# Patient Record
Sex: FEMALE | Race: WHITE | Employment: OTHER | URBAN - METROPOLITAN AREA
[De-identification: names, ages, dates, MRNs, and addresses within clinical notes are randomized per-mention and may not be internally consistent; named-entity substitution may affect disease eponyms.]

---

## 2024-01-01 ENCOUNTER — APPOINTMENT (OUTPATIENT)
Facility: HOSPITAL | Age: 74
End: 2024-01-01
Attending: INTERNAL MEDICINE
Payer: MEDICARE

## 2024-01-01 ENCOUNTER — HOSPITAL ENCOUNTER (OUTPATIENT)
Facility: HOSPITAL | Age: 74
Discharge: HOME | End: 2024-01-14
Attending: THORACIC SURGERY (CARDIOTHORACIC VASCULAR SURGERY)

## 2024-01-01 ENCOUNTER — APPOINTMENT (OUTPATIENT)
Facility: HOSPITAL | Age: 74
DRG: 235 | End: 2024-01-01
Attending: STUDENT IN AN ORGANIZED HEALTH CARE EDUCATION/TRAINING PROGRAM
Payer: MEDICARE

## 2024-01-01 ENCOUNTER — APPOINTMENT (OUTPATIENT)
Facility: HOSPITAL | Age: 74
End: 2024-01-01
Payer: MEDICARE

## 2024-01-01 ENCOUNTER — PREP FOR PROCEDURE (OUTPATIENT)
Age: 74
End: 2024-01-01

## 2024-01-01 ENCOUNTER — ANESTHESIA (OUTPATIENT)
Facility: HOSPITAL | Age: 74
End: 2024-01-01
Payer: MEDICARE

## 2024-01-01 ENCOUNTER — ANESTHESIA EVENT (OUTPATIENT)
Facility: HOSPITAL | Age: 74
End: 2024-01-01
Payer: MEDICARE

## 2024-01-01 ENCOUNTER — HOSPITAL ENCOUNTER (INPATIENT)
Facility: HOSPITAL | Age: 74
LOS: 3 days | DRG: 235 | End: 2024-01-14
Attending: STUDENT IN AN ORGANIZED HEALTH CARE EDUCATION/TRAINING PROGRAM | Admitting: STUDENT IN AN ORGANIZED HEALTH CARE EDUCATION/TRAINING PROGRAM
Payer: MEDICARE

## 2024-01-01 ENCOUNTER — HOSPITAL ENCOUNTER (INPATIENT)
Facility: HOSPITAL | Age: 74
LOS: 1 days | Discharge: ANOTHER HOSPITAL | End: 2024-01-11
Attending: EMERGENCY MEDICINE | Admitting: HOSPITALIST
Payer: MEDICARE

## 2024-01-01 VITALS
TEMPERATURE: 97.9 F | RESPIRATION RATE: 20 BRPM | WEIGHT: 250 LBS | DIASTOLIC BLOOD PRESSURE: 72 MMHG | HEIGHT: 67 IN | BODY MASS INDEX: 39.24 KG/M2 | SYSTOLIC BLOOD PRESSURE: 155 MMHG | HEART RATE: 55 BPM | OXYGEN SATURATION: 97 %

## 2024-01-01 VITALS
TEMPERATURE: 96.8 F | OXYGEN SATURATION: 100 % | DIASTOLIC BLOOD PRESSURE: 51 MMHG | WEIGHT: 293 LBS | RESPIRATION RATE: 23 BRPM | SYSTOLIC BLOOD PRESSURE: 83 MMHG | HEIGHT: 67 IN | BODY MASS INDEX: 45.99 KG/M2

## 2024-01-01 DIAGNOSIS — I20.0 UNSTABLE ANGINA (HCC): Primary | ICD-10-CM

## 2024-01-01 DIAGNOSIS — I25.10 DISEASE OF CARDIOVASCULAR SYSTEM: ICD-10-CM

## 2024-01-01 DIAGNOSIS — I25.118 CORONARY ARTERY DISEASE OF NATIVE ARTERY OF NATIVE HEART WITH STABLE ANGINA PECTORIS (HCC): ICD-10-CM

## 2024-01-01 DIAGNOSIS — I25.9 CHEST PAIN DUE TO MYOCARDIAL ISCHEMIA, UNSPECIFIED ISCHEMIC CHEST PAIN TYPE: ICD-10-CM

## 2024-01-01 DIAGNOSIS — Z95.1 S/P CABG X 3: ICD-10-CM

## 2024-01-01 DIAGNOSIS — I25.10 DISEASE OF CARDIOVASCULAR SYSTEM: Primary | ICD-10-CM

## 2024-01-01 DIAGNOSIS — I21.4 NON-STEMI (NON-ST ELEVATED MYOCARDIAL INFARCTION) (HCC): ICD-10-CM

## 2024-01-01 DIAGNOSIS — I48.0 PAF (PAROXYSMAL ATRIAL FIBRILLATION) (HCC): ICD-10-CM

## 2024-01-01 DIAGNOSIS — I21.4 NSTEMI (NON-ST ELEVATED MYOCARDIAL INFARCTION) (HCC): ICD-10-CM

## 2024-01-01 DIAGNOSIS — Z95.1 POSTSURGICAL AORTOCORONARY BYPASS STATUS: Primary | ICD-10-CM

## 2024-01-01 LAB
ABO + RH BLD: NORMAL
ACUTE KIDNEY INJURY RISK NEPHROCHECK: 1.39 (ref 0–0.3)
ACUTE KIDNEY INJURY RISK NEPHROCHECK: 2.77 (ref 0–0.3)
ALBUMIN SERPL-MCNC: 2.6 G/DL (ref 3.5–5)
ALBUMIN SERPL-MCNC: 2.7 G/DL (ref 3.5–5)
ALBUMIN SERPL-MCNC: 2.8 G/DL (ref 3.5–5)
ALBUMIN SERPL-MCNC: 3 G/DL (ref 3.5–5)
ALBUMIN SERPL-MCNC: 3 G/DL (ref 3.5–5)
ALBUMIN SERPL-MCNC: 3.1 G/DL (ref 3.5–5)
ALBUMIN SERPL-MCNC: 3.4 G/DL (ref 3.4–5)
ALBUMIN SERPL-MCNC: 3.4 G/DL (ref 3.5–5)
ALBUMIN/GLOB SERPL: 0.8 (ref 0.8–1.7)
ALBUMIN/GLOB SERPL: 0.8 (ref 1.1–2.2)
ALBUMIN/GLOB SERPL: 1.3 (ref 1.1–2.2)
ALBUMIN/GLOB SERPL: 1.6 (ref 1.1–2.2)
ALBUMIN/GLOB SERPL: 1.8 (ref 1.1–2.2)
ALBUMIN/GLOB SERPL: 2 (ref 1.1–2.2)
ALP SERPL-CCNC: 27 U/L (ref 45–117)
ALP SERPL-CCNC: 28 U/L (ref 45–117)
ALP SERPL-CCNC: 31 U/L (ref 45–117)
ALP SERPL-CCNC: 37 U/L (ref 45–117)
ALP SERPL-CCNC: 38 U/L (ref 45–117)
ALP SERPL-CCNC: 38 U/L (ref 45–117)
ALP SERPL-CCNC: 57 U/L (ref 45–117)
ALP SERPL-CCNC: 59 U/L (ref 45–117)
ALT SERPL-CCNC: 1271 U/L (ref 12–78)
ALT SERPL-CCNC: 21 U/L (ref 12–78)
ALT SERPL-CCNC: 2381 U/L (ref 12–78)
ALT SERPL-CCNC: 26 U/L (ref 12–78)
ALT SERPL-CCNC: 28 U/L (ref 12–78)
ALT SERPL-CCNC: 32 U/L (ref 13–56)
ALT SERPL-CCNC: 34 U/L (ref 12–78)
ALT SERPL-CCNC: 565 U/L (ref 12–78)
ANION GAP BLD CALC-SCNC: 21 (ref 10–20)
ANION GAP BLD CALC-SCNC: 5 (ref 10–20)
ANION GAP BLD CALC-SCNC: ABNORMAL (ref 10–20)
ANION GAP SERPL CALC-SCNC: 10 MMOL/L (ref 5–15)
ANION GAP SERPL CALC-SCNC: 13 MMOL/L (ref 5–15)
ANION GAP SERPL CALC-SCNC: 13 MMOL/L (ref 5–15)
ANION GAP SERPL CALC-SCNC: 18 MMOL/L (ref 5–15)
ANION GAP SERPL CALC-SCNC: 21 MMOL/L (ref 5–15)
ANION GAP SERPL CALC-SCNC: 27 MMOL/L (ref 5–15)
ANION GAP SERPL CALC-SCNC: 8 MMOL/L (ref 5–15)
ANION GAP SERPL CALC-SCNC: 9 MMOL/L (ref 3–18)
ANION GAP SERPL CALC-SCNC: 9 MMOL/L (ref 3–18)
ANION GAP SERPL CALC-SCNC: 9 MMOL/L (ref 5–15)
APPEARANCE UR: CLEAR
APTT PPP: 114.8 SEC (ref 23–36.4)
APTT PPP: 23.1 SEC (ref 22.1–31)
APTT PPP: 28.6 SEC (ref 22.1–31)
APTT PPP: 28.9 SEC (ref 22.1–31)
APTT PPP: 30.6 SEC (ref 23–36.4)
APTT PPP: 39.1 SEC (ref 22.1–31)
APTT PPP: 40.6 SEC (ref 23–36.4)
APTT PPP: 42.5 SEC (ref 23–36.4)
APTT PPP: 53.4 SEC (ref 22.1–31)
AST SERPL-CCNC: 1809 U/L (ref 15–37)
AST SERPL-CCNC: 27 U/L (ref 15–37)
AST SERPL-CCNC: 29 U/L (ref 10–38)
AST SERPL-CCNC: 29 U/L (ref 15–37)
AST SERPL-CCNC: 35 U/L (ref 15–37)
AST SERPL-CCNC: 36 U/L (ref 15–37)
AST SERPL-CCNC: 699 U/L (ref 15–37)
AST SERPL-CCNC: >2000 U/L (ref 15–37)
BACTERIA URNS QL MICRO: NEGATIVE /HPF
BASE DEFICIT BLD-SCNC: 13.5 MMOL/L
BASE DEFICIT BLD-SCNC: 13.6 MMOL/L
BASE DEFICIT BLD-SCNC: 15.7 MMOL/L
BASE DEFICIT BLD-SCNC: 17.7 MMOL/L
BASE DEFICIT BLD-SCNC: 17.8 MMOL/L
BASE DEFICIT BLD-SCNC: 19 MMOL/L
BASE DEFICIT BLD-SCNC: 2.6 MMOL/L
BASE DEFICIT BLD-SCNC: 20.2 MMOL/L
BASE DEFICIT BLD-SCNC: 20.4 MMOL/L
BASE DEFICIT BLD-SCNC: 22 MMOL/L
BASE DEFICIT BLD-SCNC: 22.1 MMOL/L
BASE DEFICIT BLD-SCNC: 22.4 MMOL/L
BASE DEFICIT BLD-SCNC: 23.3 MMOL/L
BASE DEFICIT BLD-SCNC: 24.4 MMOL/L
BASE DEFICIT BLD-SCNC: 3.4 MMOL/L
BASE DEFICIT BLD-SCNC: 4.9 MMOL/L
BASE DEFICIT BLD-SCNC: 5.4 MMOL/L
BASE DEFICIT BLD-SCNC: 5.7 MMOL/L
BASE DEFICIT BLD-SCNC: 6.3 MMOL/L
BASE DEFICIT BLD-SCNC: 7.5 MMOL/L
BASE DEFICIT BLD-SCNC: 8.9 MMOL/L
BASOPHILS # BLD: 0 K/UL (ref 0–0.1)
BASOPHILS # BLD: 0.1 K/UL (ref 0–0.1)
BASOPHILS NFR BLD: 0 % (ref 0–1)
BASOPHILS NFR BLD: 1 % (ref 0–1)
BASOPHILS NFR BLD: 1 % (ref 0–2)
BASOPHILS NFR BLD: 1 % (ref 0–2)
BDY SITE: ABNORMAL
BILIRUB SERPL-MCNC: 0.5 MG/DL (ref 0.2–1)
BILIRUB SERPL-MCNC: 0.6 MG/DL (ref 0.2–1)
BILIRUB SERPL-MCNC: 0.6 MG/DL (ref 0.2–1)
BILIRUB SERPL-MCNC: 0.7 MG/DL (ref 0.2–1)
BILIRUB SERPL-MCNC: 0.9 MG/DL (ref 0.2–1)
BILIRUB UR QL: NEGATIVE
BLD PROD TYP BPU: NORMAL
BLOOD BANK DISPENSE STATUS: NORMAL
BLOOD GROUP ANTIBODIES SERPL: NORMAL
BODY TEMPERATURE: 97
BPU ID: NORMAL
BUN SERPL-MCNC: 11 MG/DL (ref 6–20)
BUN SERPL-MCNC: 12 MG/DL (ref 6–20)
BUN SERPL-MCNC: 12 MG/DL (ref 7–18)
BUN SERPL-MCNC: 13 MG/DL (ref 7–18)
BUN SERPL-MCNC: 15 MG/DL (ref 6–20)
BUN SERPL-MCNC: 16 MG/DL (ref 6–20)
BUN SERPL-MCNC: 17 MG/DL (ref 6–20)
BUN SERPL-MCNC: 18 MG/DL (ref 6–20)
BUN SERPL-MCNC: 23 MG/DL (ref 6–20)
BUN SERPL-MCNC: 27 MG/DL (ref 6–20)
BUN/CREAT SERPL: 11 (ref 12–20)
BUN/CREAT SERPL: 13 (ref 12–20)
BUN/CREAT SERPL: 13 (ref 12–20)
BUN/CREAT SERPL: 14 (ref 12–20)
BUN/CREAT SERPL: 19 (ref 12–20)
BUN/CREAT SERPL: 19 (ref 12–20)
BUN/CREAT SERPL: 8 (ref 12–20)
BUN/CREAT SERPL: 8 (ref 12–20)
BUN/CREAT SERPL: 9 (ref 12–20)
BUN/CREAT SERPL: 9 (ref 12–20)
CA-I BLD-MCNC: 1.06 MMOL/L (ref 1.12–1.32)
CA-I BLD-MCNC: 1.21 MMOL/L (ref 1.12–1.32)
CA-I BLD-MCNC: 1.35 MMOL/L (ref 1.12–1.32)
CA-I BLD-SCNC: 1.01 MMOL/L (ref 1.12–1.32)
CA-I BLD-SCNC: 1.02 MMOL/L (ref 1.12–1.32)
CA-I BLD-SCNC: 1.04 MMOL/L (ref 1.12–1.32)
CA-I BLD-SCNC: 1.05 MMOL/L (ref 1.12–1.32)
CA-I BLD-SCNC: 1.06 MMOL/L (ref 1.12–1.32)
CA-I BLD-SCNC: 1.07 MMOL/L (ref 1.12–1.32)
CA-I BLD-SCNC: 1.08 MMOL/L (ref 1.12–1.32)
CA-I BLD-SCNC: 1.08 MMOL/L (ref 1.12–1.32)
CA-I BLD-SCNC: 1.1 MMOL/L (ref 1.12–1.32)
CA-I BLD-SCNC: 1.11 MMOL/L (ref 1.12–1.32)
CA-I BLD-SCNC: 1.12 MMOL/L (ref 1.12–1.32)
CA-I BLD-SCNC: 1.16 MMOL/L (ref 1.12–1.32)
CA-I BLD-SCNC: 1.2 MMOL/L (ref 1.12–1.32)
CA-I BLD-SCNC: 1.21 MMOL/L (ref 1.12–1.32)
CA-I BLD-SCNC: 1.21 MMOL/L (ref 1.12–1.32)
CA-I BLD-SCNC: 1.22 MMOL/L (ref 1.12–1.32)
CA-I BLD-SCNC: 1.25 MMOL/L (ref 1.12–1.32)
CALCIUM SERPL-MCNC: 7.5 MG/DL (ref 8.5–10.1)
CALCIUM SERPL-MCNC: 7.6 MG/DL (ref 8.5–10.1)
CALCIUM SERPL-MCNC: 7.7 MG/DL (ref 8.5–10.1)
CALCIUM SERPL-MCNC: 7.7 MG/DL (ref 8.5–10.1)
CALCIUM SERPL-MCNC: 7.8 MG/DL (ref 8.5–10.1)
CALCIUM SERPL-MCNC: 8.1 MG/DL (ref 8.5–10.1)
CALCIUM SERPL-MCNC: 8.2 MG/DL (ref 8.5–10.1)
CALCIUM SERPL-MCNC: 9 MG/DL (ref 8.5–10.1)
CALCIUM SERPL-MCNC: 9.3 MG/DL (ref 8.5–10.1)
CALCIUM SERPL-MCNC: 9.9 MG/DL (ref 8.5–10.1)
CHLORIDE BLD-SCNC: 112 MMOL/L (ref 100–108)
CHLORIDE BLD-SCNC: 123 MMOL/L (ref 100–108)
CHLORIDE SERPL-SCNC: 110 MMOL/L (ref 100–111)
CHLORIDE SERPL-SCNC: 112 MMOL/L (ref 100–111)
CHLORIDE SERPL-SCNC: 112 MMOL/L (ref 97–108)
CHLORIDE SERPL-SCNC: 114 MMOL/L (ref 97–108)
CHLORIDE SERPL-SCNC: 114 MMOL/L (ref 97–108)
CHLORIDE SERPL-SCNC: 117 MMOL/L (ref 97–108)
CHLORIDE SERPL-SCNC: 118 MMOL/L (ref 97–108)
CHLORIDE SERPL-SCNC: 119 MMOL/L (ref 97–108)
CK SERPL-CCNC: 1991 U/L (ref 26–192)
CK SERPL-CCNC: 766 U/L (ref 26–192)
CO2 BLD-SCNC: 20 MMOL/L (ref 19–24)
CO2 BLD-SCNC: 8 MMOL/L (ref 19–24)
CO2 SERPL-SCNC: 13 MMOL/L (ref 21–32)
CO2 SERPL-SCNC: 14 MMOL/L (ref 21–32)
CO2 SERPL-SCNC: 14 MMOL/L (ref 21–32)
CO2 SERPL-SCNC: 16 MMOL/L (ref 21–32)
CO2 SERPL-SCNC: 18 MMOL/L (ref 21–32)
CO2 SERPL-SCNC: 20 MMOL/L (ref 21–32)
CO2 SERPL-SCNC: 20 MMOL/L (ref 21–32)
CO2 SERPL-SCNC: 21 MMOL/L (ref 21–32)
COLOR UR: ABNORMAL
CREAT SERPL-MCNC: 0.63 MG/DL (ref 0.55–1.02)
CREAT SERPL-MCNC: 0.67 MG/DL (ref 0.6–1.3)
CREAT SERPL-MCNC: 0.86 MG/DL (ref 0.55–1.02)
CREAT SERPL-MCNC: 1.1 MG/DL (ref 0.6–1.3)
CREAT SERPL-MCNC: 1.11 MG/DL (ref 0.55–1.02)
CREAT SERPL-MCNC: 1.34 MG/DL (ref 0.55–1.02)
CREAT SERPL-MCNC: 1.77 MG/DL (ref 0.55–1.02)
CREAT SERPL-MCNC: 2.06 MG/DL (ref 0.55–1.02)
CREAT SERPL-MCNC: 2.78 MG/DL (ref 0.55–1.02)
CREAT SERPL-MCNC: 3.28 MG/DL (ref 0.55–1.02)
CREAT UR-MCNC: 0.5 MG/DL (ref 0.6–1.3)
CREAT UR-MCNC: 1.3 MG/DL (ref 0.6–1.3)
CREAT UR-MCNC: 3 MG/DL (ref 0.6–1.3)
D DIMER PPP FEU-MCNC: 0.32 UG/ML(FEU)
DIFFERENTIAL METHOD BLD: ABNORMAL
ECHO AO ASC DIAM: 3.1 CM
ECHO AO ASCENDING AORTA INDEX: 1.4 CM/M2
ECHO AO ROOT DIAM: 3.4 CM
ECHO AO ROOT DIAM: 3.4 CM
ECHO AO ROOT INDEX: 1.43 CM/M2
ECHO AO ROOT INDEX: 1.53 CM/M2
ECHO AV AREA PEAK VELOCITY: 2.5 CM2
ECHO AV AREA VTI: 2.9 CM2
ECHO AV AREA/BSA PEAK VELOCITY: 1.1 CM2/M2
ECHO AV AREA/BSA VTI: 1.3 CM2/M2
ECHO AV MEAN GRADIENT: 5 MMHG
ECHO AV MEAN VELOCITY: 1 M/S
ECHO AV PEAK GRADIENT: 11 MMHG
ECHO AV PEAK GRADIENT: 11 MMHG
ECHO AV PEAK VELOCITY: 1.7 M/S
ECHO AV PEAK VELOCITY: 1.7 M/S
ECHO AV VELOCITY RATIO: 0.53
ECHO AV VELOCITY RATIO: 0.71
ECHO AV VTI: 29.2 CM
ECHO BSA: 2.32 M2
ECHO BSA: 2.32 M2
ECHO BSA: 2.51 M2
ECHO LA DIAMETER INDEX: 1.18 CM/M2
ECHO LA DIAMETER INDEX: 2.03 CM/M2
ECHO LA DIAMETER: 2.8 CM
ECHO LA DIAMETER: 4.5 CM
ECHO LA TO AORTIC ROOT RATIO: 0.82
ECHO LA TO AORTIC ROOT RATIO: 1.32
ECHO LA VOL A-L A2C: 36 ML (ref 22–52)
ECHO LA VOL A-L A4C: 41 ML (ref 22–52)
ECHO LA VOL BP: 42 ML (ref 22–52)
ECHO LA VOL MOD A2C: 34 ML (ref 22–52)
ECHO LA VOL MOD A4C: 39 ML (ref 22–52)
ECHO LA VOL/BSA BIPLANE: 19 ML/M2 (ref 16–34)
ECHO LA VOLUME AREA LENGTH: 44 ML
ECHO LA VOLUME INDEX A-L A2C: 16 ML/M2 (ref 16–34)
ECHO LA VOLUME INDEX A-L A4C: 18 ML/M2 (ref 16–34)
ECHO LA VOLUME INDEX AREA LENGTH: 20 ML/M2 (ref 16–34)
ECHO LA VOLUME INDEX MOD A2C: 15 ML/M2 (ref 16–34)
ECHO LA VOLUME INDEX MOD A4C: 18 ML/M2 (ref 16–34)
ECHO LV E' LATERAL VELOCITY: 10 CM/S
ECHO LV E' LATERAL VELOCITY: 7 CM/S
ECHO LV E' SEPTAL VELOCITY: 5 CM/S
ECHO LV E' SEPTAL VELOCITY: 8 CM/S
ECHO LV EDV A2C: 109 ML
ECHO LV EDV A2C: 120 ML
ECHO LV EDV A4C: 117 ML
ECHO LV EDV A4C: 121 ML
ECHO LV EDV BP: 117 ML (ref 56–104)
ECHO LV EDV BP: 121 ML (ref 56–104)
ECHO LV EDV INDEX A4C: 49 ML/M2
ECHO LV EDV INDEX A4C: 55 ML/M2
ECHO LV EDV INDEX BP: 51 ML/M2
ECHO LV EDV INDEX BP: 53 ML/M2
ECHO LV EDV NDEX A2C: 49 ML/M2
ECHO LV EDV NDEX A2C: 50 ML/M2
ECHO LV EJECTION FRACTION A2C: 64 %
ECHO LV EJECTION FRACTION A2C: 66 %
ECHO LV EJECTION FRACTION A4C: 37 %
ECHO LV EJECTION FRACTION A4C: 60 %
ECHO LV EJECTION FRACTION BIPLANE: 55 % (ref 55–100)
ECHO LV EJECTION FRACTION BIPLANE: 61 % (ref 55–100)
ECHO LV ESV A2C: 39 ML
ECHO LV ESV A2C: 41 ML
ECHO LV ESV A4C: 48 ML
ECHO LV ESV A4C: 74 ML
ECHO LV ESV BP: 45 ML (ref 19–49)
ECHO LV ESV BP: 55 ML (ref 19–49)
ECHO LV ESV INDEX A2C: 17 ML/M2
ECHO LV ESV INDEX A2C: 18 ML/M2
ECHO LV ESV INDEX A4C: 22 ML/M2
ECHO LV ESV INDEX A4C: 31 ML/M2
ECHO LV ESV INDEX BP: 20 ML/M2
ECHO LV ESV INDEX BP: 23 ML/M2
ECHO LV FRACTIONAL SHORTENING: 28 % (ref 28–44)
ECHO LV FRACTIONAL SHORTENING: 36 % (ref 28–44)
ECHO LV INTERNAL DIMENSION DIASTOLE INDEX: 1.97 CM/M2
ECHO LV INTERNAL DIMENSION DIASTOLE INDEX: 2.66 CM/M2
ECHO LV INTERNAL DIMENSION DIASTOLIC: 4.7 CM (ref 3.9–5.3)
ECHO LV INTERNAL DIMENSION DIASTOLIC: 5.9 CM (ref 3.9–5.3)
ECHO LV INTERNAL DIMENSION SYSTOLIC INDEX: 1.43 CM/M2
ECHO LV INTERNAL DIMENSION SYSTOLIC INDEX: 1.71 CM/M2
ECHO LV INTERNAL DIMENSION SYSTOLIC: 3.4 CM
ECHO LV INTERNAL DIMENSION SYSTOLIC: 3.8 CM
ECHO LV IVSD: 1 CM (ref 0.6–0.9)
ECHO LV MASS 2D: 288.5 G (ref 67–162)
ECHO LV MASS INDEX 2D: 129.9 G/M2 (ref 43–95)
ECHO LV POSTERIOR WALL DIASTOLIC: 1.3 CM (ref 0.6–0.9)
ECHO LV RELATIVE WALL THICKNESS RATIO: 0.44
ECHO LVOT AREA: 3.8 CM2
ECHO LVOT AV VTI INDEX: 0.8
ECHO LVOT DIAM: 2.2 CM
ECHO LVOT MEAN GRADIENT: 3 MMHG
ECHO LVOT PEAK GRADIENT: 3 MMHG
ECHO LVOT PEAK GRADIENT: 5 MMHG
ECHO LVOT PEAK VELOCITY: 0.9 M/S
ECHO LVOT PEAK VELOCITY: 1.2 M/S
ECHO LVOT STROKE VOLUME INDEX: 40 ML/M2
ECHO LVOT SV: 88.9 ML
ECHO LVOT VTI: 23.4 CM
ECHO MV A VELOCITY: 0.68 M/S
ECHO MV A VELOCITY: 0.71 M/S
ECHO MV AREA PHT: 5.7 CM2
ECHO MV E DECELERATION TIME (DT): 132.5 MS
ECHO MV E DECELERATION TIME (DT): 149 MS
ECHO MV E VELOCITY: 0.47 M/S
ECHO MV E VELOCITY: 0.68 M/S
ECHO MV E/A RATIO: 0.69
ECHO MV E/A RATIO: 0.96
ECHO MV E/E' LATERAL: 6.71
ECHO MV E/E' LATERAL: 6.8
ECHO MV E/E' RATIO (AVERAGED): 7.65
ECHO MV E/E' RATIO (AVERAGED): 8.06
ECHO MV PRESSURE HALF TIME (PHT): 38.4 MS
ECHO PV MAX VELOCITY: 1.5 M/S
ECHO PV PEAK GRADIENT: 9 MMHG
ECHO RA END SYSTOLIC VOLUME APICAL 4 CHAMBER INDEX BSA: 26 ML/M2
ECHO RA VOLUME: 58 ML
ECHO RV FREE WALL PEAK S': 19 CM/S
ECHO RV INTERNAL DIMENSION: 4.1 CM
ECHO RV TAPSE: 2.3 CM (ref 1.7–?)
EKG ATRIAL RATE: 49 BPM
EKG ATRIAL RATE: 55 BPM
EKG ATRIAL RATE: 66 BPM
EKG ATRIAL RATE: 84 BPM
EKG DIAGNOSIS: NORMAL
EKG P AXIS: -21 DEGREES
EKG P AXIS: 38 DEGREES
EKG P AXIS: 71 DEGREES
EKG P AXIS: 77 DEGREES
EKG P-R INTERVAL: 152 MS
EKG P-R INTERVAL: 156 MS
EKG P-R INTERVAL: 162 MS
EKG P-R INTERVAL: 174 MS
EKG Q-T INTERVAL: 398 MS
EKG Q-T INTERVAL: 426 MS
EKG Q-T INTERVAL: 458 MS
EKG Q-T INTERVAL: 472 MS
EKG QRS DURATION: 108 MS
EKG QRS DURATION: 110 MS
EKG QRS DURATION: 112 MS
EKG QRS DURATION: 118 MS
EKG QTC CALCULATION (BAZETT): 413 MS
EKG QTC CALCULATION (BAZETT): 446 MS
EKG QTC CALCULATION (BAZETT): 451 MS
EKG QTC CALCULATION (BAZETT): 470 MS
EKG R AXIS: -33 DEGREES
EKG R AXIS: -35 DEGREES
EKG R AXIS: -35 DEGREES
EKG R AXIS: -41 DEGREES
EKG T AXIS: 135 DEGREES
EKG T AXIS: 18 DEGREES
EKG T AXIS: 4 DEGREES
EKG T AXIS: 95 DEGREES
EKG VENTRICULAR RATE: 49 BPM
EKG VENTRICULAR RATE: 55 BPM
EKG VENTRICULAR RATE: 66 BPM
EKG VENTRICULAR RATE: 84 BPM
EOSINOPHIL # BLD: 0 K/UL (ref 0–0.4)
EOSINOPHIL # BLD: 0.1 K/UL (ref 0–0.4)
EOSINOPHIL # BLD: 0.1 K/UL (ref 0–0.4)
EOSINOPHIL # BLD: 0.2 K/UL (ref 0–0.4)
EOSINOPHIL NFR BLD: 0 % (ref 0–7)
EOSINOPHIL NFR BLD: 1 % (ref 0–5)
EOSINOPHIL NFR BLD: 1 % (ref 0–5)
EOSINOPHIL NFR BLD: 1 % (ref 0–7)
EPITH CASTS URNS QL MICRO: ABNORMAL /LPF
ERYTHROCYTE [DISTWIDTH] IN BLOOD BY AUTOMATED COUNT: 14.5 % (ref 11.5–14.5)
ERYTHROCYTE [DISTWIDTH] IN BLOOD BY AUTOMATED COUNT: 14.6 % (ref 11.5–14.5)
ERYTHROCYTE [DISTWIDTH] IN BLOOD BY AUTOMATED COUNT: 14.6 % (ref 11.6–14.5)
ERYTHROCYTE [DISTWIDTH] IN BLOOD BY AUTOMATED COUNT: 14.7 % (ref 11.5–14.5)
ERYTHROCYTE [DISTWIDTH] IN BLOOD BY AUTOMATED COUNT: 14.8 % (ref 11.5–14.5)
ERYTHROCYTE [DISTWIDTH] IN BLOOD BY AUTOMATED COUNT: 14.9 % (ref 11.5–14.5)
ERYTHROCYTE [DISTWIDTH] IN BLOOD BY AUTOMATED COUNT: 15 % (ref 11.5–14.5)
ERYTHROCYTE [DISTWIDTH] IN BLOOD BY AUTOMATED COUNT: 15 % (ref 11.6–14.5)
ERYTHROCYTE [DISTWIDTH] IN BLOOD BY AUTOMATED COUNT: 15.7 % (ref 11.5–14.5)
ERYTHROCYTE [DISTWIDTH] IN BLOOD BY AUTOMATED COUNT: 15.7 % (ref 11.5–14.5)
EST. AVERAGE GLUCOSE BLD GHB EST-MCNC: 143 MG/DL
EST. AVERAGE GLUCOSE BLD GHB EST-MCNC: 143 MG/DL
FIBRINOGEN PPP-MCNC: 146 MG/DL (ref 200–475)
FIO2 ON VENT: 50 %
GAS FLOW.O2 O2 DELIVERY SYS: ABNORMAL
GLOBULIN SER CALC-MCNC: 1.3 G/DL (ref 2–4)
GLOBULIN SER CALC-MCNC: 1.4 G/DL (ref 2–4)
GLOBULIN SER CALC-MCNC: 1.5 G/DL (ref 2–4)
GLOBULIN SER CALC-MCNC: 1.7 G/DL (ref 2–4)
GLOBULIN SER CALC-MCNC: 1.9 G/DL (ref 2–4)
GLOBULIN SER CALC-MCNC: 2.4 G/DL (ref 2–4)
GLOBULIN SER CALC-MCNC: 3.6 G/DL (ref 2–4)
GLOBULIN SER CALC-MCNC: 4.2 G/DL (ref 2–4)
GLUCOSE BLD STRIP.AUTO-MCNC: 101 MG/DL (ref 65–117)
GLUCOSE BLD STRIP.AUTO-MCNC: 103 MG/DL (ref 65–117)
GLUCOSE BLD STRIP.AUTO-MCNC: 104 MG/DL (ref 65–117)
GLUCOSE BLD STRIP.AUTO-MCNC: 107 MG/DL (ref 65–117)
GLUCOSE BLD STRIP.AUTO-MCNC: 113 MG/DL (ref 65–117)
GLUCOSE BLD STRIP.AUTO-MCNC: 114 MG/DL (ref 65–117)
GLUCOSE BLD STRIP.AUTO-MCNC: 114 MG/DL (ref 65–117)
GLUCOSE BLD STRIP.AUTO-MCNC: 117 MG/DL (ref 65–117)
GLUCOSE BLD STRIP.AUTO-MCNC: 122 MG/DL (ref 65–117)
GLUCOSE BLD STRIP.AUTO-MCNC: 123 MG/DL (ref 65–117)
GLUCOSE BLD STRIP.AUTO-MCNC: 129 MG/DL (ref 65–117)
GLUCOSE BLD STRIP.AUTO-MCNC: 129 MG/DL (ref 65–117)
GLUCOSE BLD STRIP.AUTO-MCNC: 130 MG/DL (ref 65–117)
GLUCOSE BLD STRIP.AUTO-MCNC: 131 MG/DL (ref 74–106)
GLUCOSE BLD STRIP.AUTO-MCNC: 135 MG/DL (ref 65–117)
GLUCOSE BLD STRIP.AUTO-MCNC: 146 MG/DL (ref 65–117)
GLUCOSE BLD STRIP.AUTO-MCNC: 146 MG/DL (ref 70–110)
GLUCOSE BLD STRIP.AUTO-MCNC: 149 MG/DL (ref 70–110)
GLUCOSE BLD STRIP.AUTO-MCNC: 159 MG/DL (ref 74–106)
GLUCOSE BLD STRIP.AUTO-MCNC: 163 MG/DL (ref 65–117)
GLUCOSE BLD STRIP.AUTO-MCNC: 165 MG/DL (ref 65–117)
GLUCOSE BLD STRIP.AUTO-MCNC: 174 MG/DL (ref 65–117)
GLUCOSE BLD STRIP.AUTO-MCNC: 174 MG/DL (ref 65–117)
GLUCOSE BLD STRIP.AUTO-MCNC: 201 MG/DL (ref 65–117)
GLUCOSE BLD STRIP.AUTO-MCNC: 229 MG/DL (ref 65–117)
GLUCOSE BLD STRIP.AUTO-MCNC: 250 MG/DL (ref 65–117)
GLUCOSE BLD STRIP.AUTO-MCNC: 264 MG/DL (ref 65–117)
GLUCOSE BLD STRIP.AUTO-MCNC: 57 MG/DL (ref 65–117)
GLUCOSE BLD STRIP.AUTO-MCNC: 60 MG/DL (ref 65–117)
GLUCOSE BLD STRIP.AUTO-MCNC: 60 MG/DL (ref 65–117)
GLUCOSE BLD STRIP.AUTO-MCNC: 62 MG/DL (ref 65–117)
GLUCOSE BLD STRIP.AUTO-MCNC: 63 MG/DL (ref 65–117)
GLUCOSE BLD STRIP.AUTO-MCNC: 73 MG/DL (ref 74–106)
GLUCOSE BLD STRIP.AUTO-MCNC: 81 MG/DL (ref 65–117)
GLUCOSE BLD STRIP.AUTO-MCNC: 85 MG/DL (ref 65–117)
GLUCOSE BLD STRIP.AUTO-MCNC: 89 MG/DL (ref 65–117)
GLUCOSE BLD STRIP.AUTO-MCNC: 96 MG/DL (ref 65–117)
GLUCOSE BLD STRIP.AUTO-MCNC: 99 MG/DL (ref 65–117)
GLUCOSE BLD STRIP.AUTO-MCNC: 99 MG/DL (ref 65–117)
GLUCOSE SERPL-MCNC: 127 MG/DL (ref 65–100)
GLUCOSE SERPL-MCNC: 132 MG/DL (ref 65–100)
GLUCOSE SERPL-MCNC: 134 MG/DL (ref 74–99)
GLUCOSE SERPL-MCNC: 136 MG/DL (ref 65–100)
GLUCOSE SERPL-MCNC: 139 MG/DL (ref 65–100)
GLUCOSE SERPL-MCNC: 148 MG/DL (ref 74–99)
GLUCOSE SERPL-MCNC: 209 MG/DL (ref 65–100)
GLUCOSE SERPL-MCNC: 272 MG/DL (ref 65–100)
GLUCOSE SERPL-MCNC: 82 MG/DL (ref 65–100)
GLUCOSE SERPL-MCNC: 89 MG/DL (ref 65–100)
GLUCOSE UR STRIP.AUTO-MCNC: 100 MG/DL
HBA1C MFR BLD: 6.6 % (ref 4.2–5.6)
HBA1C MFR BLD: 6.6 % (ref 4–5.6)
HCO3 BLD-SCNC: 10.6 MMOL/L (ref 22–26)
HCO3 BLD-SCNC: 11.2 MMOL/L (ref 22–26)
HCO3 BLD-SCNC: 11.7 MMOL/L (ref 22–26)
HCO3 BLD-SCNC: 14.4 MMOL/L (ref 22–26)
HCO3 BLD-SCNC: 15.9 MMOL/L (ref 22–26)
HCO3 BLD-SCNC: 18.1 MMOL/L (ref 22–26)
HCO3 BLD-SCNC: 19.2 MMOL/L (ref 22–26)
HCO3 BLD-SCNC: 19.2 MMOL/L (ref 22–26)
HCO3 BLD-SCNC: 19.5 MMOL/L (ref 22–26)
HCO3 BLD-SCNC: 19.8 MMOL/L (ref 22–26)
HCO3 BLD-SCNC: 20.5 MMOL/L (ref 22–26)
HCO3 BLD-SCNC: 7.8 MMOL/L (ref 22–26)
HCO3 BLD-SCNC: 8.4 MMOL/L (ref 22–26)
HCO3 BLD-SCNC: 8.8 MMOL/L (ref 22–26)
HCO3 BLD-SCNC: 9 MMOL/L (ref 22–26)
HCO3 BLD-SCNC: 9.4 MMOL/L (ref 22–26)
HCO3 BLD-SCNC: 9.6 MMOL/L (ref 22–26)
HCO3 BLD-SCNC: 9.9 MMOL/L (ref 22–26)
HCO3 BLDA-SCNC: 11 MMOL/L
HCO3 BLDA-SCNC: 20 MMOL/L
HCO3 BLDA-SCNC: 7 MMOL/L
HCT VFR BLD AUTO: 19.8 % (ref 35–47)
HCT VFR BLD AUTO: 21.6 % (ref 35–47)
HCT VFR BLD AUTO: 23.3 % (ref 35–47)
HCT VFR BLD AUTO: 23.8 % (ref 35–47)
HCT VFR BLD AUTO: 25.8 % (ref 35–47)
HCT VFR BLD AUTO: 26.1 % (ref 35–47)
HCT VFR BLD AUTO: 27.1 % (ref 35–47)
HCT VFR BLD AUTO: 30 % (ref 35–47)
HCT VFR BLD AUTO: 34.1 % (ref 35–47)
HCT VFR BLD AUTO: 38.8 % (ref 35–47)
HCT VFR BLD AUTO: 43.1 % (ref 35–47)
HCT VFR BLD AUTO: 44.3 % (ref 35–47)
HCT VFR BLD AUTO: 45.1 % (ref 35–45)
HCT VFR BLD AUTO: 45.3 % (ref 35–45)
HCT VFR BLD AUTO: 51.8 % (ref 35–45)
HGB BLD-MCNC: 11 G/DL (ref 11.5–16)
HGB BLD-MCNC: 12.9 G/DL (ref 11.5–16)
HGB BLD-MCNC: 14.2 G/DL (ref 11.5–16)
HGB BLD-MCNC: 14.4 G/DL (ref 11.5–16)
HGB BLD-MCNC: 14.6 G/DL (ref 12–16)
HGB BLD-MCNC: 14.7 G/DL (ref 12–16)
HGB BLD-MCNC: 16.6 G/DL (ref 12–16)
HGB BLD-MCNC: 6.4 G/DL (ref 11.5–16)
HGB BLD-MCNC: 7.3 G/DL (ref 11.5–16)
HGB BLD-MCNC: 7.7 G/DL (ref 11.5–16)
HGB BLD-MCNC: 8.2 G/DL (ref 11.5–16)
HGB BLD-MCNC: 8.3 G/DL (ref 11.5–16)
HGB BLD-MCNC: 9.3 G/DL (ref 11.5–16)
HGB BLD-MCNC: 9.3 G/DL (ref 11.5–16)
HGB BLD-MCNC: 9.5 G/DL (ref 11.5–16)
HGB UR QL STRIP: ABNORMAL
HISTORY CHECK: NORMAL
HYALINE CASTS URNS QL MICRO: ABNORMAL /LPF (ref 0–5)
IMM GRANULOCYTES # BLD AUTO: 0 K/UL
IMM GRANULOCYTES # BLD AUTO: 0.1 K/UL (ref 0–0.04)
IMM GRANULOCYTES # BLD AUTO: 0.2 K/UL (ref 0–0.04)
IMM GRANULOCYTES # BLD AUTO: 0.4 K/UL (ref 0–0.04)
IMM GRANULOCYTES NFR BLD AUTO: 0 %
IMM GRANULOCYTES NFR BLD AUTO: 1 % (ref 0–0.5)
IMM GRANULOCYTES NFR BLD AUTO: 2 % (ref 0–0.5)
INR PPP: 1 (ref 0.9–1.1)
INR PPP: 1.1 (ref 0.9–1.1)
INR PPP: 1.4 (ref 0.9–1.1)
INR PPP: 1.6 (ref 0.9–1.1)
INR PPP: 1.8 (ref 0.9–1.1)
KETONES UR QL STRIP.AUTO: ABNORMAL MG/DL
LACTATE BLD-SCNC: 0.74 MMOL/L (ref 0.4–2)
LACTATE BLD-SCNC: 13.02 MMOL/L (ref 0.4–2)
LACTATE SERPL-SCNC: 10 MMOL/L (ref 0.4–2)
LACTATE SERPL-SCNC: 11.8 MMOL/L (ref 0.4–2)
LACTATE SERPL-SCNC: 12.2 MMOL/L (ref 0.4–2)
LACTATE SERPL-SCNC: 14.3 MMOL/L (ref 0.4–2)
LACTATE SERPL-SCNC: 17.2 MMOL/L (ref 0.4–2)
LACTATE SERPL-SCNC: 27.8 MMOL/L (ref 0.4–2)
LACTATE SERPL-SCNC: 29.5 MMOL/L (ref 0.4–2)
LACTATE SERPL-SCNC: 7.8 MMOL/L (ref 0.4–2)
LACTATE SERPL-SCNC: >30 MMOL/L (ref 0.4–2)
LACTATE SERPL-SCNC: >30 MMOL/L (ref 0.4–2)
LEUKOCYTE ESTERASE UR QL STRIP.AUTO: NEGATIVE
LYMPHOCYTES # BLD: 1.6 K/UL (ref 0.8–3.5)
LYMPHOCYTES # BLD: 2 K/UL (ref 0.9–3.6)
LYMPHOCYTES # BLD: 2.2 K/UL (ref 0.8–3.5)
LYMPHOCYTES # BLD: 2.5 K/UL (ref 0.8–3.5)
LYMPHOCYTES # BLD: 2.5 K/UL (ref 0.9–3.6)
LYMPHOCYTES # BLD: 3.5 K/UL (ref 0.8–3.5)
LYMPHOCYTES NFR BLD: 11 % (ref 12–49)
LYMPHOCYTES NFR BLD: 15 % (ref 12–49)
LYMPHOCYTES NFR BLD: 19 % (ref 12–49)
LYMPHOCYTES NFR BLD: 20 % (ref 21–52)
LYMPHOCYTES NFR BLD: 24 % (ref 21–52)
LYMPHOCYTES NFR BLD: 9 % (ref 12–49)
MAGNESIUM SERPL-MCNC: 2 MG/DL (ref 1.6–2.4)
MAGNESIUM SERPL-MCNC: 2 MG/DL (ref 1.6–2.4)
MAGNESIUM SERPL-MCNC: 2 MG/DL (ref 1.6–2.6)
MAGNESIUM SERPL-MCNC: 2.1 MG/DL (ref 1.6–2.6)
MAGNESIUM SERPL-MCNC: 2.2 MG/DL (ref 1.6–2.4)
MAGNESIUM SERPL-MCNC: 2.7 MG/DL (ref 1.6–2.4)
MAGNESIUM SERPL-MCNC: 2.8 MG/DL (ref 1.6–2.4)
MCH RBC QN AUTO: 28.3 PG (ref 26–34)
MCH RBC QN AUTO: 28.5 PG (ref 24–34)
MCH RBC QN AUTO: 28.6 PG (ref 26–34)
MCH RBC QN AUTO: 28.7 PG (ref 24–34)
MCH RBC QN AUTO: 28.9 PG (ref 26–34)
MCH RBC QN AUTO: 29.1 PG (ref 26–34)
MCH RBC QN AUTO: 29.1 PG (ref 26–34)
MCH RBC QN AUTO: 29.2 PG (ref 26–34)
MCH RBC QN AUTO: 29.7 PG (ref 26–34)
MCH RBC QN AUTO: 30.5 PG (ref 26–34)
MCH RBC QN AUTO: 30.7 PG (ref 26–34)
MCH RBC QN AUTO: 31 PG (ref 26–34)
MCH RBC QN AUTO: 31.1 PG (ref 26–34)
MCHC RBC AUTO-ENTMCNC: 31 G/DL (ref 30–36.5)
MCHC RBC AUTO-ENTMCNC: 32 G/DL (ref 31–37)
MCHC RBC AUTO-ENTMCNC: 32.1 G/DL (ref 30–36.5)
MCHC RBC AUTO-ENTMCNC: 32.3 G/DL (ref 30–36.5)
MCHC RBC AUTO-ENTMCNC: 32.3 G/DL (ref 30–36.5)
MCHC RBC AUTO-ENTMCNC: 32.4 G/DL (ref 30–36.5)
MCHC RBC AUTO-ENTMCNC: 32.4 G/DL (ref 31–37)
MCHC RBC AUTO-ENTMCNC: 33.2 G/DL (ref 30–36.5)
MCHC RBC AUTO-ENTMCNC: 33.4 G/DL (ref 30–36.5)
MCHC RBC AUTO-ENTMCNC: 33.8 G/DL (ref 30–36.5)
MCHC RBC AUTO-ENTMCNC: 35.1 G/DL (ref 30–36.5)
MCHC RBC AUTO-ENTMCNC: 35.2 G/DL (ref 30–36.5)
MCHC RBC AUTO-ENTMCNC: 35.6 G/DL (ref 30–36.5)
MCV RBC AUTO: 86.4 FL (ref 80–99)
MCV RBC AUTO: 87 FL (ref 80–99)
MCV RBC AUTO: 87.1 FL (ref 80–99)
MCV RBC AUTO: 87.3 FL (ref 80–99)
MCV RBC AUTO: 87.4 FL (ref 80–99)
MCV RBC AUTO: 88.4 FL (ref 80–99)
MCV RBC AUTO: 88.6 FL (ref 78–100)
MCV RBC AUTO: 88.8 FL (ref 80–99)
MCV RBC AUTO: 89 FL (ref 78–100)
MCV RBC AUTO: 89.8 FL (ref 80–99)
MCV RBC AUTO: 90.4 FL (ref 80–99)
MCV RBC AUTO: 91.9 FL (ref 80–99)
MCV RBC AUTO: 95.8 FL (ref 80–99)
MONOCYTES # BLD: 0.7 K/UL (ref 0.05–1.2)
MONOCYTES # BLD: 0.8 K/UL (ref 0.05–1.2)
MONOCYTES # BLD: 1 K/UL (ref 0–1)
MONOCYTES # BLD: 1.1 K/UL (ref 0–1)
MONOCYTES # BLD: 1.5 K/UL (ref 0–1)
MONOCYTES # BLD: 2.9 K/UL (ref 0–1)
MONOCYTES NFR BLD: 12 % (ref 5–13)
MONOCYTES NFR BLD: 6 % (ref 3–10)
MONOCYTES NFR BLD: 6 % (ref 5–13)
MONOCYTES NFR BLD: 7 % (ref 5–13)
MONOCYTES NFR BLD: 8 % (ref 3–10)
MONOCYTES NFR BLD: 9 % (ref 5–13)
NEUTS SEG # BLD: 13.7 K/UL (ref 1.8–8)
NEUTS SEG # BLD: 18.7 K/UL (ref 1.8–8)
NEUTS SEG # BLD: 19.1 K/UL (ref 1.8–8)
NEUTS SEG # BLD: 6.8 K/UL (ref 1.8–8)
NEUTS SEG # BLD: 6.9 K/UL (ref 1.8–8)
NEUTS SEG # BLD: 7.8 K/UL (ref 1.8–8)
NEUTS SEG NFR BLD: 67 % (ref 40–73)
NEUTS SEG NFR BLD: 70 % (ref 40–73)
NEUTS SEG NFR BLD: 73 % (ref 32–75)
NEUTS SEG NFR BLD: 75 % (ref 32–75)
NEUTS SEG NFR BLD: 78 % (ref 32–75)
NEUTS SEG NFR BLD: 81 % (ref 32–75)
NITRITE UR QL STRIP.AUTO: NEGATIVE
NRBC # BLD: 0 K/UL (ref 0–0.01)
NRBC # BLD: 0.04 K/UL (ref 0–0.01)
NRBC # BLD: 0.04 K/UL (ref 0–0.01)
NRBC # BLD: 0.05 K/UL (ref 0–0.01)
NRBC # BLD: 0.07 K/UL (ref 0–0.01)
NRBC # BLD: 0.15 K/UL (ref 0–0.01)
NRBC # BLD: 0.18 K/UL (ref 0–0.01)
NRBC BLD-RTO: 0 PER 100 WBC
NRBC BLD-RTO: 0.2 PER 100 WBC
NRBC BLD-RTO: 0.3 PER 100 WBC
NRBC BLD-RTO: 0.6 PER 100 WBC
NRBC BLD-RTO: 0.6 PER 100 WBC
O2/TOTAL GAS SETTING VFR VENT: 50 %
PCO2 BLD: 21.5 MMHG (ref 35–45)
PCO2 BLD: 24.8 MMHG (ref 35–45)
PCO2 BLD: 28.7 MMHG (ref 35–45)
PCO2 BLD: 29.1 MMHG (ref 35–45)
PCO2 BLD: 29.2 MMHG (ref 35–45)
PCO2 BLD: 29.9 MMHG (ref 35–45)
PCO2 BLD: 34.4 MMHG (ref 35–45)
PCO2 BLD: 34.9 MMHG (ref 35–45)
PCO2 BLD: 36.1 MMHG (ref 35–45)
PCO2 BLD: 36.6 MMHG (ref 35–45)
PCO2 BLD: 37.3 MMHG (ref 35–45)
PCO2 BLD: 38.4 MMHG (ref 35–45)
PCO2 BLD: 39.2 MMHG (ref 35–45)
PCO2 BLD: 39.5 MMHG (ref 35–45)
PCO2 BLD: 40.9 MMHG (ref 35–45)
PCO2 BLD: 41.3 MMHG (ref 35–45)
PCO2 BLD: 42.3 MMHG (ref 35–45)
PCO2 BLD: 43.2 MMHG (ref 35–45)
PCO2 BLD: 44.3 MMHG (ref 35–45)
PCO2 BLD: 45 MMHG (ref 35–45)
PCO2 BLD: 49.1 MMHG (ref 35–45)
PEEP RESPIRATORY: 5
PEEP RESPIRATORY: 5 CMH2O
PH BLD: 6.85 (ref 7.35–7.45)
PH BLD: 6.89 (ref 7.35–7.45)
PH BLD: 6.9 (ref 7.35–7.45)
PH BLD: 6.91 (ref 7.35–7.45)
PH BLD: 6.91 (ref 7.35–7.45)
PH BLD: 6.99 (ref 7.35–7.45)
PH BLD: 6.99 (ref 7.35–7.45)
PH BLD: 7.05 (ref 7.35–7.45)
PH BLD: 7.09 (ref 7.35–7.45)
PH BLD: 7.13 (ref 7.35–7.45)
PH BLD: 7.17 (ref 7.35–7.45)
PH BLD: 7.18 (ref 7.35–7.45)
PH BLD: 7.28 (ref 7.35–7.45)
PH BLD: 7.31 (ref 7.35–7.45)
PH BLD: 7.32 (ref 7.35–7.45)
PH BLD: 7.34 (ref 7.35–7.45)
PH BLD: 7.34 (ref 7.35–7.45)
PH BLD: 7.35 (ref 7.35–7.45)
PH BLD: 7.35 (ref 7.35–7.45)
PH BLD: 7.44 (ref 7.35–7.45)
PH BLD: 7.45 (ref 7.35–7.45)
PH UR STRIP: 5.5 (ref 5–8)
PHOSPHATE SERPL-MCNC: 3.3 MG/DL (ref 2.6–4.7)
PLATELET # BLD AUTO: 101 K/UL (ref 150–400)
PLATELET # BLD AUTO: 102 K/UL (ref 150–400)
PLATELET # BLD AUTO: 104 K/UL (ref 150–400)
PLATELET # BLD AUTO: 108 K/UL (ref 150–400)
PLATELET # BLD AUTO: 140 K/UL (ref 150–400)
PLATELET # BLD AUTO: 150 K/UL (ref 150–400)
PLATELET # BLD AUTO: 178 K/UL (ref 150–400)
PLATELET # BLD AUTO: 196 K/UL (ref 150–400)
PLATELET # BLD AUTO: 21 K/UL (ref 150–400)
PLATELET # BLD AUTO: 264 K/UL (ref 150–400)
PLATELET # BLD AUTO: 268 K/UL (ref 135–420)
PLATELET # BLD AUTO: 311 K/UL (ref 135–420)
PLATELET # BLD AUTO: 89 K/UL (ref 150–400)
PMV BLD AUTO: 10.3 FL (ref 8.9–12.9)
PMV BLD AUTO: 10.7 FL (ref 8.9–12.9)
PMV BLD AUTO: 10.9 FL (ref 8.9–12.9)
PMV BLD AUTO: 11.4 FL (ref 8.9–12.9)
PMV BLD AUTO: 11.6 FL (ref 8.9–12.9)
PMV BLD AUTO: 11.6 FL (ref 8.9–12.9)
PMV BLD AUTO: 9 FL (ref 8.9–12.9)
PMV BLD AUTO: 9.1 FL (ref 8.9–12.9)
PMV BLD AUTO: 9.2 FL (ref 9.2–11.8)
PMV BLD AUTO: 9.3 FL (ref 8.9–12.9)
PMV BLD AUTO: 9.4 FL (ref 9.2–11.8)
PMV BLD AUTO: 9.7 FL (ref 8.9–12.9)
PMV BLD AUTO: 9.8 FL (ref 8.9–12.9)
PO2 BLD: 100 MMHG (ref 80–100)
PO2 BLD: 109 MMHG (ref 80–100)
PO2 BLD: 109 MMHG (ref 80–100)
PO2 BLD: 115 MMHG (ref 80–100)
PO2 BLD: 115 MMHG (ref 80–100)
PO2 BLD: 126 MMHG (ref 80–100)
PO2 BLD: 145 MMHG (ref 80–100)
PO2 BLD: 180 MMHG (ref 80–100)
PO2 BLD: 63 MMHG (ref 80–100)
PO2 BLD: 77 MMHG (ref 80–100)
PO2 BLD: 80 MMHG (ref 80–100)
PO2 BLD: 84 MMHG (ref 80–100)
PO2 BLD: 86 MMHG (ref 80–100)
PO2 BLD: 87 MMHG (ref 80–100)
PO2 BLD: 89 MMHG (ref 80–100)
PO2 BLD: 90 MMHG (ref 80–100)
PO2 BLD: 90 MMHG (ref 80–100)
PO2 BLD: 91 MMHG (ref 80–100)
PO2 BLD: 91 MMHG (ref 80–100)
PO2 BLD: 92 MMHG (ref 80–100)
PO2 BLD: 93 MMHG (ref 80–100)
POTASSIUM BLD-SCNC: 3.6 MMOL/L (ref 3.5–5.5)
POTASSIUM BLD-SCNC: 4 MMOL/L (ref 3.5–5.5)
POTASSIUM BLD-SCNC: 4.6 MMOL/L (ref 3.5–5.5)
POTASSIUM SERPL-SCNC: 3.7 MMOL/L (ref 3.5–5.1)
POTASSIUM SERPL-SCNC: 3.8 MMOL/L (ref 3.5–5.1)
POTASSIUM SERPL-SCNC: 4.1 MMOL/L (ref 3.5–5.1)
POTASSIUM SERPL-SCNC: 4.1 MMOL/L (ref 3.5–5.5)
POTASSIUM SERPL-SCNC: 4.2 MMOL/L (ref 3.5–5.5)
POTASSIUM SERPL-SCNC: 4.8 MMOL/L (ref 3.5–5.1)
POTASSIUM SERPL-SCNC: 4.9 MMOL/L (ref 3.5–5.1)
POTASSIUM SERPL-SCNC: 5.2 MMOL/L (ref 3.5–5.1)
POTASSIUM SERPL-SCNC: 5.4 MMOL/L (ref 3.5–5.1)
POTASSIUM SERPL-SCNC: 5.4 MMOL/L (ref 3.5–5.1)
POTASSIUM SERPL-SCNC: ABNORMAL MMOL/L (ref 3.5–5.1)
PROCALCITONIN SERPL-MCNC: 4.75 NG/ML
PROT SERPL-MCNC: 3.9 G/DL (ref 6.4–8.2)
PROT SERPL-MCNC: 4.2 G/DL (ref 6.4–8.2)
PROT SERPL-MCNC: 4.2 G/DL (ref 6.4–8.2)
PROT SERPL-MCNC: 5 G/DL (ref 6.4–8.2)
PROT SERPL-MCNC: 5.1 G/DL (ref 6.4–8.2)
PROT SERPL-MCNC: 5.4 G/DL (ref 6.4–8.2)
PROT SERPL-MCNC: 6.6 G/DL (ref 6.4–8.2)
PROT SERPL-MCNC: 7.6 G/DL (ref 6.4–8.2)
PROT UR STRIP-MCNC: NEGATIVE MG/DL
PROTHROMBIN TIME: 10.6 SEC (ref 9–11.1)
PROTHROMBIN TIME: 10.9 SEC (ref 9–11.1)
PROTHROMBIN TIME: 11.9 SEC (ref 9–11.1)
PROTHROMBIN TIME: 13.1 SEC (ref 11.9–14.7)
PROTHROMBIN TIME: 14.3 SEC (ref 9–11.1)
PROTHROMBIN TIME: 16.6 SEC (ref 9–11.1)
PROTHROMBIN TIME: 17.9 SEC (ref 9–11.1)
RBC # BLD AUTO: 2.19 M/UL (ref 3.8–5.2)
RBC # BLD AUTO: 2.35 M/UL (ref 3.8–5.2)
RBC # BLD AUTO: 2.65 M/UL (ref 3.8–5.2)
RBC # BLD AUTO: 2.67 M/UL (ref 3.8–5.2)
RBC # BLD AUTO: 3 M/UL (ref 3.8–5.2)
RBC # BLD AUTO: 3.11 M/UL (ref 3.8–5.2)
RBC # BLD AUTO: 3.13 M/UL (ref 3.8–5.2)
RBC # BLD AUTO: 3.84 M/UL (ref 3.8–5.2)
RBC # BLD AUTO: 4.44 M/UL (ref 3.8–5.2)
RBC # BLD AUTO: 4.99 M/UL (ref 3.8–5.2)
RBC # BLD AUTO: 5.01 M/UL (ref 3.8–5.2)
RBC # BLD AUTO: 5.09 M/UL (ref 4.2–5.3)
RBC # BLD AUTO: 5.82 M/UL (ref 4.2–5.3)
RBC #/AREA URNS HPF: >100 /HPF (ref 0–5)
RBC MORPH BLD: ABNORMAL
SAO2 % BLD: 85.8 % (ref 92–97)
SAO2 % BLD: 87 % (ref 92–97)
SAO2 % BLD: 88 % (ref 92–97)
SAO2 % BLD: 90.4 % (ref 92–97)
SAO2 % BLD: 90.4 % (ref 92–97)
SAO2 % BLD: 90.6 % (ref 92–97)
SAO2 % BLD: 91 %
SAO2 % BLD: 91 %
SAO2 % BLD: 91 % (ref 92–97)
SAO2 % BLD: 93.4 % (ref 92–97)
SAO2 % BLD: 94.4 % (ref 92–97)
SAO2 % BLD: 94.5 % (ref 92–97)
SAO2 % BLD: 94.7 % (ref 92–97)
SAO2 % BLD: 96.2 % (ref 92–97)
SAO2 % BLD: 97.6 % (ref 92–97)
SAO2 % BLD: 98 %
SAO2 % BLD: 98.1 % (ref 92–97)
SAO2 % BLD: 98.1 % (ref 92–97)
SAO2 % BLD: 98.7 % (ref 92–97)
SAO2 % BLD: 99.1 % (ref 92–97)
SAO2 % BLD: 99.5 % (ref 92–97)
SARS-COV-2 RDRP RESP QL NAA+PROBE: NOT DETECTED
SERVICE CMNT-IMP: ABNORMAL
SERVICE CMNT-IMP: NORMAL
SODIUM BLD-SCNC: 137 MMOL/L (ref 136–145)
SODIUM BLD-SCNC: 152 MMOL/L (ref 136–145)
SODIUM SERPL-SCNC: 140 MMOL/L (ref 136–145)
SODIUM SERPL-SCNC: 140 MMOL/L (ref 136–145)
SODIUM SERPL-SCNC: 142 MMOL/L (ref 136–145)
SODIUM SERPL-SCNC: 144 MMOL/L (ref 136–145)
SODIUM SERPL-SCNC: 145 MMOL/L (ref 136–145)
SODIUM SERPL-SCNC: 146 MMOL/L (ref 136–145)
SODIUM SERPL-SCNC: 152 MMOL/L (ref 136–145)
SODIUM SERPL-SCNC: 152 MMOL/L (ref 136–145)
SODIUM SERPL-SCNC: 154 MMOL/L (ref 136–145)
SODIUM SERPL-SCNC: 154 MMOL/L (ref 136–145)
SOURCE: NORMAL
SP GR UR REFRACTOMETRY: 1.03 (ref 1–1.03)
SPECIMEN EXP DATE BLD: NORMAL
SPECIMEN SITE: ABNORMAL
SPECIMEN TYPE: ABNORMAL
THERAPEUTIC RANGE: ABNORMAL SECS (ref 58–77)
THERAPEUTIC RANGE: ABNORMAL SECS (ref 58–77)
THERAPEUTIC RANGE: NORMAL SECS (ref 58–77)
TROPONIN I SERPL HS-MCNC: 11 NG/L (ref 0–54)
TROPONIN I SERPL HS-MCNC: 115 NG/L (ref 0–54)
TROPONIN I SERPL HS-MCNC: 1499 NG/L (ref 0–54)
TROPONIN I SERPL HS-MCNC: 1944 NG/L (ref 0–54)
TROPONIN I SERPL HS-MCNC: 366 NG/L (ref 0–54)
TSH SERPL DL<=0.05 MIU/L-ACNC: 0.65 UIU/ML (ref 0.36–3.74)
TSH SERPL DL<=0.05 MIU/L-ACNC: 1.45 UIU/ML (ref 0.36–3.74)
UFH PPP CHRO-ACNC: 0.71 IU/ML
UNIT DIVISION: 0
URINE CULTURE IF INDICATED: ABNORMAL
UROBILINOGEN UR QL STRIP.AUTO: 0.2 EU/DL (ref 0.2–1)
WBC # BLD AUTO: 10.3 K/UL (ref 4.6–13.2)
WBC # BLD AUTO: 10.6 K/UL (ref 3.6–11)
WBC # BLD AUTO: 12.1 K/UL (ref 3.6–11)
WBC # BLD AUTO: 14.7 K/UL (ref 3.6–11)
WBC # BLD AUTO: 18.3 K/UL (ref 3.6–11)
WBC # BLD AUTO: 19.7 K/UL (ref 3.6–11)
WBC # BLD AUTO: 20.6 K/UL (ref 3.6–11)
WBC # BLD AUTO: 23.1 K/UL (ref 3.6–11)
WBC # BLD AUTO: 23.2 K/UL (ref 3.6–11)
WBC # BLD AUTO: 24.4 K/UL (ref 3.6–11)
WBC # BLD AUTO: 25.2 K/UL (ref 3.6–11)
WBC # BLD AUTO: 28.2 K/UL (ref 3.6–11)
WBC # BLD AUTO: 9.9 K/UL (ref 4.6–13.2)
WBC URNS QL MICRO: ABNORMAL /HPF (ref 0–4)

## 2024-01-01 PROCEDURE — 6360000002 HC RX W HCPCS

## 2024-01-01 PROCEDURE — 2500000003 HC RX 250 WO HCPCS: Performed by: ANESTHESIOLOGY

## 2024-01-01 PROCEDURE — 6370000000 HC RX 637 (ALT 250 FOR IP): Performed by: HOSPITALIST

## 2024-01-01 PROCEDURE — 85610 PROTHROMBIN TIME: CPT

## 2024-01-01 PROCEDURE — 6360000002 HC RX W HCPCS: Performed by: INTERNAL MEDICINE

## 2024-01-01 PROCEDURE — A4216 STERILE WATER/SALINE, 10 ML: HCPCS

## 2024-01-01 PROCEDURE — 93880 EXTRACRANIAL BILAT STUDY: CPT

## 2024-01-01 PROCEDURE — 86923 COMPATIBILITY TEST ELECTRIC: CPT

## 2024-01-01 PROCEDURE — P9040 RBC LEUKOREDUCED IRRADIATED: HCPCS

## 2024-01-01 PROCEDURE — 82550 ASSAY OF CK (CPK): CPT

## 2024-01-01 PROCEDURE — 82330 ASSAY OF CALCIUM: CPT

## 2024-01-01 PROCEDURE — 02100Z9 BYPASS CORONARY ARTERY, ONE ARTERY FROM LEFT INTERNAL MAMMARY, OPEN APPROACH: ICD-10-PCS | Performed by: PHYSICIAN ASSISTANT

## 2024-01-01 PROCEDURE — 2500000003 HC RX 250 WO HCPCS: Performed by: NURSE ANESTHETIST, CERTIFIED REGISTERED

## 2024-01-01 PROCEDURE — 86850 RBC ANTIBODY SCREEN: CPT

## 2024-01-01 PROCEDURE — 93306 TTE W/DOPPLER COMPLETE: CPT

## 2024-01-01 PROCEDURE — 3700000001 HC ADD 15 MINUTES (ANESTHESIA): Performed by: THORACIC SURGERY (CARDIOTHORACIC VASCULAR SURGERY)

## 2024-01-01 PROCEDURE — P9016 RBC LEUKOCYTES REDUCED: HCPCS

## 2024-01-01 PROCEDURE — 2500000003 HC RX 250 WO HCPCS

## 2024-01-01 PROCEDURE — P9045 ALBUMIN (HUMAN), 5%, 250 ML: HCPCS | Performed by: INTERNAL MEDICINE

## 2024-01-01 PROCEDURE — 37799 UNLISTED PX VASCULAR SURGERY: CPT

## 2024-01-01 PROCEDURE — 82947 ASSAY GLUCOSE BLOOD QUANT: CPT

## 2024-01-01 PROCEDURE — 6370000000 HC RX 637 (ALT 250 FOR IP): Performed by: EMERGENCY MEDICINE

## 2024-01-01 PROCEDURE — 2580000003 HC RX 258

## 2024-01-01 PROCEDURE — 2580000003 HC RX 258: Performed by: INTERNAL MEDICINE

## 2024-01-01 PROCEDURE — 6360000002 HC RX W HCPCS: Performed by: ANESTHESIOLOGY

## 2024-01-01 PROCEDURE — 87635 SARS-COV-2 COVID-19 AMP PRB: CPT

## 2024-01-01 PROCEDURE — 2709999900 HC NON-CHARGEABLE SUPPLY

## 2024-01-01 PROCEDURE — B2111ZZ FLUOROSCOPY OF MULTIPLE CORONARY ARTERIES USING LOW OSMOLAR CONTRAST: ICD-10-PCS | Performed by: INTERNAL MEDICINE

## 2024-01-01 PROCEDURE — 2500000003 HC RX 250 WO HCPCS: Performed by: PHYSICIAN ASSISTANT

## 2024-01-01 PROCEDURE — 2500000003 HC RX 250 WO HCPCS: Performed by: INTERNAL MEDICINE

## 2024-01-01 PROCEDURE — 84100 ASSAY OF PHOSPHORUS: CPT

## 2024-01-01 PROCEDURE — C1894 INTRO/SHEATH, NON-LASER: HCPCS | Performed by: INTERNAL MEDICINE

## 2024-01-01 PROCEDURE — 6360000002 HC RX W HCPCS: Performed by: THORACIC SURGERY (CARDIOTHORACIC VASCULAR SURGERY)

## 2024-01-01 PROCEDURE — 6360000002 HC RX W HCPCS: Performed by: PHYSICIAN ASSISTANT

## 2024-01-01 PROCEDURE — 3600000002 HC SURGERY LEVEL 2 BASE

## 2024-01-01 PROCEDURE — 6360000002 HC RX W HCPCS: Performed by: STUDENT IN AN ORGANIZED HEALTH CARE EDUCATION/TRAINING PROGRAM

## 2024-01-01 PROCEDURE — 84443 ASSAY THYROID STIM HORMONE: CPT

## 2024-01-01 PROCEDURE — 83605 ASSAY OF LACTIC ACID: CPT

## 2024-01-01 PROCEDURE — 36600 WITHDRAWAL OF ARTERIAL BLOOD: CPT

## 2024-01-01 PROCEDURE — 36415 COLL VENOUS BLD VENIPUNCTURE: CPT

## 2024-01-01 PROCEDURE — 71045 X-RAY EXAM CHEST 1 VIEW: CPT

## 2024-01-01 PROCEDURE — 2010000000 HC RM ICU SURGICAL

## 2024-01-01 PROCEDURE — C1887 CATHETER, GUIDING: HCPCS | Performed by: INTERNAL MEDICINE

## 2024-01-01 PROCEDURE — P9045 ALBUMIN (HUMAN), 5%, 250 ML: HCPCS | Performed by: NURSE ANESTHETIST, CERTIFIED REGISTERED

## 2024-01-01 PROCEDURE — P9045 ALBUMIN (HUMAN), 5%, 250 ML: HCPCS

## 2024-01-01 PROCEDURE — 3600000008 HC SURGERY OHS BASE: Performed by: THORACIC SURGERY (CARDIOTHORACIC VASCULAR SURGERY)

## 2024-01-01 PROCEDURE — 2580000003 HC RX 258: Performed by: STUDENT IN AN ORGANIZED HEALTH CARE EDUCATION/TRAINING PROGRAM

## 2024-01-01 PROCEDURE — 94002 VENT MGMT INPAT INIT DAY: CPT

## 2024-01-01 PROCEDURE — 33970 AORTIC CIRCULATION ASSIST: CPT | Performed by: INTERNAL MEDICINE

## 2024-01-01 PROCEDURE — 6360000004 HC RX CONTRAST MEDICATION: Performed by: STUDENT IN AN ORGANIZED HEALTH CARE EDUCATION/TRAINING PROGRAM

## 2024-01-01 PROCEDURE — 6370000000 HC RX 637 (ALT 250 FOR IP): Performed by: STUDENT IN AN ORGANIZED HEALTH CARE EDUCATION/TRAINING PROGRAM

## 2024-01-01 PROCEDURE — 6370000000 HC RX 637 (ALT 250 FOR IP): Performed by: NURSE ANESTHETIST, CERTIFIED REGISTERED

## 2024-01-01 PROCEDURE — 80048 BASIC METABOLIC PNL TOTAL CA: CPT

## 2024-01-01 PROCEDURE — 2580000003 HC RX 258: Performed by: NURSE ANESTHETIST, CERTIFIED REGISTERED

## 2024-01-01 PROCEDURE — 2580000003 HC RX 258: Performed by: THORACIC SURGERY (CARDIOTHORACIC VASCULAR SURGERY)

## 2024-01-01 PROCEDURE — 2580000003 HC RX 258: Performed by: NURSE PRACTITIONER

## 2024-01-01 PROCEDURE — 82962 GLUCOSE BLOOD TEST: CPT

## 2024-01-01 PROCEDURE — 84132 ASSAY OF SERUM POTASSIUM: CPT

## 2024-01-01 PROCEDURE — 83735 ASSAY OF MAGNESIUM: CPT

## 2024-01-01 PROCEDURE — 30233K1 TRANSFUSION OF NONAUTOLOGOUS FROZEN PLASMA INTO PERIPHERAL VEIN, PERCUTANEOUS APPROACH: ICD-10-PCS | Performed by: STUDENT IN AN ORGANIZED HEALTH CARE EDUCATION/TRAINING PROGRAM

## 2024-01-01 PROCEDURE — C1769 GUIDE WIRE: HCPCS | Performed by: INTERNAL MEDICINE

## 2024-01-01 PROCEDURE — 82803 BLOOD GASES ANY COMBINATION: CPT

## 2024-01-01 PROCEDURE — 93005 ELECTROCARDIOGRAM TRACING: CPT

## 2024-01-01 PROCEDURE — A4217 STERILE WATER/SALINE, 500 ML: HCPCS

## 2024-01-01 PROCEDURE — 85014 HEMATOCRIT: CPT

## 2024-01-01 PROCEDURE — 84295 ASSAY OF SERUM SODIUM: CPT

## 2024-01-01 PROCEDURE — 6360000002 HC RX W HCPCS: Performed by: NURSE ANESTHETIST, CERTIFIED REGISTERED

## 2024-01-01 PROCEDURE — 81001 URINALYSIS AUTO W/SCOPE: CPT

## 2024-01-01 PROCEDURE — 6370000000 HC RX 637 (ALT 250 FOR IP): Performed by: PHYSICIAN ASSISTANT

## 2024-01-01 PROCEDURE — 85730 THROMBOPLASTIN TIME PARTIAL: CPT

## 2024-01-01 PROCEDURE — 99285 EMERGENCY DEPT VISIT HI MDM: CPT

## 2024-01-01 PROCEDURE — C1760 CLOSURE DEV, VASC: HCPCS

## 2024-01-01 PROCEDURE — P9045 ALBUMIN (HUMAN), 5%, 250 ML: HCPCS | Performed by: PHYSICIAN ASSISTANT

## 2024-01-01 PROCEDURE — 30233N1 TRANSFUSION OF NONAUTOLOGOUS RED BLOOD CELLS INTO PERIPHERAL VEIN, PERCUTANEOUS APPROACH: ICD-10-PCS | Performed by: STUDENT IN AN ORGANIZED HEALTH CARE EDUCATION/TRAINING PROGRAM

## 2024-01-01 PROCEDURE — 3700000000 HC ANESTHESIA ATTENDED CARE: Performed by: THORACIC SURGERY (CARDIOTHORACIC VASCULAR SURGERY)

## 2024-01-01 PROCEDURE — 87205 SMEAR GRAM STAIN: CPT

## 2024-01-01 PROCEDURE — 36430 TRANSFUSION BLD/BLD COMPNT: CPT

## 2024-01-01 PROCEDURE — 99153 MOD SED SAME PHYS/QHP EA: CPT | Performed by: INTERNAL MEDICINE

## 2024-01-01 PROCEDURE — 85025 COMPLETE CBC W/AUTO DIFF WBC: CPT

## 2024-01-01 PROCEDURE — 3700000001 HC ADD 15 MINUTES (ANESTHESIA)

## 2024-01-01 PROCEDURE — 6370000000 HC RX 637 (ALT 250 FOR IP)

## 2024-01-01 PROCEDURE — 30233R1 TRANSFUSION OF NONAUTOLOGOUS PLATELETS INTO PERIPHERAL VEIN, PERCUTANEOUS APPROACH: ICD-10-PCS | Performed by: STUDENT IN AN ORGANIZED HEALTH CARE EDUCATION/TRAINING PROGRAM

## 2024-01-01 PROCEDURE — B24BZZ4 ULTRASONOGRAPHY OF HEART WITH AORTA, TRANSESOPHAGEAL: ICD-10-PCS | Performed by: THORACIC SURGERY (CARDIOTHORACIC VASCULAR SURGERY)

## 2024-01-01 PROCEDURE — 021109W BYPASS CORONARY ARTERY, TWO ARTERIES FROM AORTA WITH AUTOLOGOUS VENOUS TISSUE, OPEN APPROACH: ICD-10-PCS | Performed by: PHYSICIAN ASSISTANT

## 2024-01-01 PROCEDURE — 93306 TTE W/DOPPLER COMPLETE: CPT | Performed by: INTERNAL MEDICINE

## 2024-01-01 PROCEDURE — 2580000003 HC RX 258: Performed by: PHYSICIAN ASSISTANT

## 2024-01-01 PROCEDURE — 85379 FIBRIN DEGRADATION QUANT: CPT

## 2024-01-01 PROCEDURE — 85027 COMPLETE CBC AUTOMATED: CPT

## 2024-01-01 PROCEDURE — 93010 ELECTROCARDIOGRAM REPORT: CPT | Performed by: SPECIALIST

## 2024-01-01 PROCEDURE — 92953 TEMPORARY EXTERNAL PACING: CPT

## 2024-01-01 PROCEDURE — 86900 BLOOD TYPING SEROLOGIC ABO: CPT

## 2024-01-01 PROCEDURE — 5A02210 ASSISTANCE WITH CARDIAC OUTPUT USING BALLOON PUMP, CONTINUOUS: ICD-10-PCS | Performed by: INTERNAL MEDICINE

## 2024-01-01 PROCEDURE — 2709999900 HC NON-CHARGEABLE SUPPLY: Performed by: INTERNAL MEDICINE

## 2024-01-01 PROCEDURE — C1729 CATH, DRAINAGE: HCPCS | Performed by: THORACIC SURGERY (CARDIOTHORACIC VASCULAR SURGERY)

## 2024-01-01 PROCEDURE — 6370000000 HC RX 637 (ALT 250 FOR IP): Performed by: INTERNAL MEDICINE

## 2024-01-01 PROCEDURE — 3600000012 HC SURGERY LEVEL 2 ADDTL 15MIN

## 2024-01-01 PROCEDURE — 80053 COMPREHEN METABOLIC PANEL: CPT

## 2024-01-01 PROCEDURE — 85384 FIBRINOGEN ACTIVITY: CPT

## 2024-01-01 PROCEDURE — C1889 IMPLANT/INSERT DEVICE, NOC: HCPCS | Performed by: INTERNAL MEDICINE

## 2024-01-01 PROCEDURE — 6360000002 HC RX W HCPCS: Performed by: EMERGENCY MEDICINE

## 2024-01-01 PROCEDURE — C8929 TTE W OR WO FOL WCON,DOPPLER: HCPCS

## 2024-01-01 PROCEDURE — 83036 HEMOGLOBIN GLYCOSYLATED A1C: CPT

## 2024-01-01 PROCEDURE — A4216 STERILE WATER/SALINE, 10 ML: HCPCS | Performed by: INTERNAL MEDICINE

## 2024-01-01 PROCEDURE — 6360000004 HC RX CONTRAST MEDICATION: Performed by: INTERNAL MEDICINE

## 2024-01-01 PROCEDURE — 85018 HEMOGLOBIN: CPT

## 2024-01-01 PROCEDURE — C1713 ANCHOR/SCREW BN/BN,TIS/BN: HCPCS | Performed by: THORACIC SURGERY (CARDIOTHORACIC VASCULAR SURGERY)

## 2024-01-01 PROCEDURE — 87070 CULTURE OTHR SPECIMN AEROBIC: CPT

## 2024-01-01 PROCEDURE — 6370000000 HC RX 637 (ALT 250 FOR IP): Performed by: NURSE PRACTITIONER

## 2024-01-01 PROCEDURE — 96374 THER/PROPH/DIAG INJ IV PUSH: CPT

## 2024-01-01 PROCEDURE — 84484 ASSAY OF TROPONIN QUANT: CPT

## 2024-01-01 PROCEDURE — 2720000010 HC SURG SUPPLY STERILE: Performed by: THORACIC SURGERY (CARDIOTHORACIC VASCULAR SURGERY)

## 2024-01-01 PROCEDURE — 93922 UPR/L XTREMITY ART 2 LEVELS: CPT

## 2024-01-01 PROCEDURE — C1894 INTRO/SHEATH, NON-LASER: HCPCS

## 2024-01-01 PROCEDURE — 36620 INSERTION CATHETER ARTERY: CPT | Performed by: ANESTHESIOLOGY

## 2024-01-01 PROCEDURE — 1100000003 HC PRIVATE W/ TELEMETRY

## 2024-01-01 PROCEDURE — 93005 ELECTROCARDIOGRAM TRACING: CPT | Performed by: THORACIC SURGERY (CARDIOTHORACIC VASCULAR SURGERY)

## 2024-01-01 PROCEDURE — 94660 CPAP INITIATION&MGMT: CPT

## 2024-01-01 PROCEDURE — 86901 BLOOD TYPING SEROLOGIC RH(D): CPT

## 2024-01-01 PROCEDURE — C1757 CATH, THROMBECTOMY/EMBOLECT: HCPCS

## 2024-01-01 PROCEDURE — 2720000010 HC SURG SUPPLY STERILE

## 2024-01-01 PROCEDURE — C1753 CATH, INTRAVAS ULTRASOUND: HCPCS

## 2024-01-01 PROCEDURE — 3700000000 HC ANESTHESIA ATTENDED CARE

## 2024-01-01 PROCEDURE — 84145 PROCALCITONIN (PCT): CPT

## 2024-01-01 PROCEDURE — 06BQ4ZZ EXCISION OF LEFT SAPHENOUS VEIN, PERCUTANEOUS ENDOSCOPIC APPROACH: ICD-10-PCS | Performed by: PHYSICIAN ASSISTANT

## 2024-01-01 PROCEDURE — 2709999900 HC NON-CHARGEABLE SUPPLY: Performed by: THORACIC SURGERY (CARDIOTHORACIC VASCULAR SURGERY)

## 2024-01-01 PROCEDURE — 93005 ELECTROCARDIOGRAM TRACING: CPT | Performed by: PHYSICIAN ASSISTANT

## 2024-01-01 PROCEDURE — 3600000018 HC SURGERY OHS ADDTL 15MIN: Performed by: THORACIC SURGERY (CARDIOTHORACIC VASCULAR SURGERY)

## 2024-01-01 PROCEDURE — 74018 RADEX ABDOMEN 1 VIEW: CPT

## 2024-01-01 PROCEDURE — 2100000000 HC CCU R&B

## 2024-01-01 PROCEDURE — 85520 HEPARIN ASSAY: CPT

## 2024-01-01 PROCEDURE — 99152 MOD SED SAME PHYS/QHP 5/>YRS: CPT | Performed by: INTERNAL MEDICINE

## 2024-01-01 PROCEDURE — C9113 INJ PANTOPRAZOLE SODIUM, VIA: HCPCS | Performed by: INTERNAL MEDICINE

## 2024-01-01 PROCEDURE — 30233N0 TRANSFUSION OF AUTOLOGOUS RED BLOOD CELLS INTO PERIPHERAL VEIN, PERCUTANEOUS APPROACH: ICD-10-PCS | Performed by: PHYSICIAN ASSISTANT

## 2024-01-01 PROCEDURE — 4A023N7 MEASUREMENT OF CARDIAC SAMPLING AND PRESSURE, LEFT HEART, PERCUTANEOUS APPROACH: ICD-10-PCS | Performed by: INTERNAL MEDICINE

## 2024-01-01 PROCEDURE — 5A02210 ASSISTANCE WITH CARDIAC OUTPUT USING BALLOON PUMP, CONTINUOUS: ICD-10-PCS | Performed by: PHYSICIAN ASSISTANT

## 2024-01-01 PROCEDURE — 2780000010 HC IMPLANT OTHER: Performed by: THORACIC SURGERY (CARDIOTHORACIC VASCULAR SURGERY)

## 2024-01-01 PROCEDURE — 93458 L HRT ARTERY/VENTRICLE ANGIO: CPT | Performed by: INTERNAL MEDICINE

## 2024-01-01 PROCEDURE — 94003 VENT MGMT INPAT SUBQ DAY: CPT

## 2024-01-01 PROCEDURE — P9073 PLATELETS PHERESIS PATH REDU: HCPCS

## 2024-01-01 PROCEDURE — 87040 BLOOD CULTURE FOR BACTERIA: CPT

## 2024-01-01 PROCEDURE — C1768 GRAFT, VASCULAR: HCPCS

## 2024-01-01 PROCEDURE — P9059 PLASMA, FRZ BETWEEN 8-24HOUR: HCPCS

## 2024-01-01 DEVICE — GORE VIABAHN ENDOPROSTHESIS WITH HEPARIN 10MMX10CM 8FR 120CMCATH
Type: IMPLANTABLE DEVICE | Site: GROIN | Status: FUNCTIONAL
Brand: GORE VIABAHN ENDOPROSTHESIS WITH HEPARIN

## 2024-01-01 RX ORDER — DEXTROSE MONOHYDRATE 25 G/50ML
25 INJECTION, SOLUTION INTRAVENOUS PRN
Status: DISCONTINUED | OUTPATIENT
Start: 2024-01-01 | End: 2024-01-01 | Stop reason: HOSPADM

## 2024-01-01 RX ORDER — CALCIUM GLUCONATE 20 MG/ML
1000 INJECTION, SOLUTION INTRAVENOUS ONCE
Status: COMPLETED | OUTPATIENT
Start: 2024-01-01 | End: 2024-01-01

## 2024-01-01 RX ORDER — INSULIN LISPRO 100 [IU]/ML
1-20 INJECTION, SOLUTION INTRAVENOUS; SUBCUTANEOUS
Status: DISCONTINUED | OUTPATIENT
Start: 2024-01-01 | End: 2024-01-01 | Stop reason: HOSPADM

## 2024-01-01 RX ORDER — INSULIN LISPRO 100 [IU]/ML
0-12 INJECTION, SOLUTION INTRAVENOUS; SUBCUTANEOUS
Status: DISCONTINUED | OUTPATIENT
Start: 2024-01-01 | End: 2024-01-01 | Stop reason: HOSPADM

## 2024-01-01 RX ORDER — SODIUM CHLORIDE 9 MG/ML
INJECTION, SOLUTION INTRAVENOUS PRN
Status: DISCONTINUED | OUTPATIENT
Start: 2024-01-01 | End: 2024-01-01

## 2024-01-01 RX ORDER — CALCIUM CHLORIDE 100 MG/ML
1000 INJECTION INTRAVENOUS; INTRAVENTRICULAR ONCE
Status: COMPLETED | OUTPATIENT
Start: 2024-01-01 | End: 2024-01-01

## 2024-01-01 RX ORDER — PANTOPRAZOLE SODIUM 40 MG/1
40 TABLET, DELAYED RELEASE ORAL
Status: DISCONTINUED | OUTPATIENT
Start: 2024-01-01 | End: 2024-01-01 | Stop reason: HOSPADM

## 2024-01-01 RX ORDER — PHENYLEPHRINE HCL IN 0.9% NACL 0.4MG/10ML
SYRINGE (ML) INTRAVENOUS PRN
Status: DISCONTINUED | OUTPATIENT
Start: 2024-01-01 | End: 2024-01-01 | Stop reason: SDUPTHER

## 2024-01-01 RX ORDER — ALBUMIN, HUMAN INJ 5% 5 %
25 SOLUTION INTRAVENOUS ONCE
Status: COMPLETED | OUTPATIENT
Start: 2024-01-01 | End: 2024-01-01

## 2024-01-01 RX ORDER — DEXTROSE MONOHYDRATE 100 MG/ML
INJECTION, SOLUTION INTRAVENOUS CONTINUOUS PRN
Status: DISCONTINUED | OUTPATIENT
Start: 2024-01-01 | End: 2024-01-01 | Stop reason: HOSPADM

## 2024-01-01 RX ORDER — AMLODIPINE BESYLATE 10 MG/1
10 TABLET ORAL DAILY
Status: ON HOLD | COMMUNITY
End: 2024-01-01 | Stop reason: HOSPADM

## 2024-01-01 RX ORDER — SODIUM CHLORIDE 9 MG/ML
INJECTION, SOLUTION INTRAVENOUS CONTINUOUS PRN
Status: DISCONTINUED | OUTPATIENT
Start: 2024-01-01 | End: 2024-01-01 | Stop reason: SDUPTHER

## 2024-01-01 RX ORDER — MIDAZOLAM HYDROCHLORIDE 1 MG/ML
INJECTION INTRAMUSCULAR; INTRAVENOUS PRN
Status: DISCONTINUED | OUTPATIENT
Start: 2024-01-01 | End: 2024-01-01 | Stop reason: HOSPADM

## 2024-01-01 RX ORDER — ACETAMINOPHEN 325 MG/1
650 TABLET ORAL EVERY 6 HOURS PRN
Status: DISCONTINUED | OUTPATIENT
Start: 2024-01-01 | End: 2024-01-01 | Stop reason: HOSPADM

## 2024-01-01 RX ORDER — DEXAMETHASONE SODIUM PHOSPHATE 4 MG/ML
INJECTION, SOLUTION INTRA-ARTICULAR; INTRALESIONAL; INTRAMUSCULAR; INTRAVENOUS; SOFT TISSUE PRN
Status: DISCONTINUED | OUTPATIENT
Start: 2024-01-01 | End: 2024-01-01 | Stop reason: SDUPTHER

## 2024-01-01 RX ORDER — BENAZEPRIL HYDROCHLORIDE 40 MG/1
40 TABLET ORAL DAILY
Status: ON HOLD | COMMUNITY
End: 2024-01-01 | Stop reason: HOSPADM

## 2024-01-01 RX ORDER — INSULIN LISPRO 100 [IU]/ML
0-6 INJECTION, SOLUTION INTRAVENOUS; SUBCUTANEOUS NIGHTLY
Status: DISCONTINUED | OUTPATIENT
Start: 2024-01-01 | End: 2024-01-01 | Stop reason: HOSPADM

## 2024-01-01 RX ORDER — ACETAMINOPHEN 325 MG/1
975 TABLET ORAL EVERY 6 HOURS SCHEDULED
Status: DISCONTINUED | OUTPATIENT
Start: 2024-01-01 | End: 2024-01-01

## 2024-01-01 RX ORDER — VERAPAMIL HYDROCHLORIDE 2.5 MG/ML
INJECTION, SOLUTION INTRAVENOUS PRN
Status: DISCONTINUED | OUTPATIENT
Start: 2024-01-01 | End: 2024-01-01 | Stop reason: HOSPADM

## 2024-01-01 RX ORDER — ATORVASTATIN CALCIUM 40 MG/1
40 TABLET, FILM COATED ORAL NIGHTLY
Qty: 30 TABLET | Refills: 3
Start: 2024-01-01 | End: 2024-01-15

## 2024-01-01 RX ORDER — SODIUM CHLORIDE 0.9 % (FLUSH) 0.9 %
5-40 SYRINGE (ML) INJECTION PRN
Status: DISCONTINUED | OUTPATIENT
Start: 2024-01-01 | End: 2024-01-01

## 2024-01-01 RX ORDER — SODIUM CHLORIDE 450 MG/100ML
INJECTION, SOLUTION INTRAVENOUS CONTINUOUS
Status: DISCONTINUED | OUTPATIENT
Start: 2024-01-01 | End: 2024-01-01 | Stop reason: HOSPADM

## 2024-01-01 RX ORDER — SODIUM CHLORIDE 9 MG/ML
INJECTION, SOLUTION INTRAVENOUS PRN
Status: DISCONTINUED | OUTPATIENT
Start: 2024-01-01 | End: 2024-01-01 | Stop reason: HOSPADM

## 2024-01-01 RX ORDER — CALCIUM CHLORIDE 100 MG/ML
INJECTION INTRAVENOUS; INTRAVENTRICULAR
Status: COMPLETED
Start: 2024-01-01 | End: 2024-01-01

## 2024-01-01 RX ORDER — POTASSIUM CHLORIDE 29.8 MG/ML
20 INJECTION INTRAVENOUS PRN
Status: DISCONTINUED | OUTPATIENT
Start: 2024-01-01 | End: 2024-01-01

## 2024-01-01 RX ORDER — POLYETHYLENE GLYCOL 3350 17 G/17G
17 POWDER, FOR SOLUTION ORAL DAILY PRN
Status: DISCONTINUED | OUTPATIENT
Start: 2024-01-01 | End: 2024-01-01 | Stop reason: HOSPADM

## 2024-01-01 RX ORDER — HEPARIN SODIUM 10000 [USP'U]/ML
INJECTION, SOLUTION INTRAVENOUS; SUBCUTANEOUS PRN
Status: DISCONTINUED | OUTPATIENT
Start: 2024-01-01 | End: 2024-01-01 | Stop reason: HOSPADM

## 2024-01-01 RX ORDER — PANTOPRAZOLE SODIUM 40 MG/1
40 TABLET, DELAYED RELEASE ORAL
Qty: 30 TABLET | Refills: 3 | Status: SHIPPED | OUTPATIENT
Start: 2024-01-01 | End: 2024-01-15

## 2024-01-01 RX ORDER — MAGNESIUM SULFATE IN WATER 40 MG/ML
2000 INJECTION, SOLUTION INTRAVENOUS PRN
Status: DISCONTINUED | OUTPATIENT
Start: 2024-01-01 | End: 2024-01-01 | Stop reason: HOSPADM

## 2024-01-01 RX ORDER — ROCURONIUM BROMIDE 10 MG/ML
INJECTION, SOLUTION INTRAVENOUS PRN
Status: DISCONTINUED | OUTPATIENT
Start: 2024-01-01 | End: 2024-01-01 | Stop reason: SDUPTHER

## 2024-01-01 RX ORDER — HEPARIN SODIUM 1000 [USP'U]/ML
INJECTION, SOLUTION INTRAVENOUS; SUBCUTANEOUS PRN
Status: DISCONTINUED | OUTPATIENT
Start: 2024-01-01 | End: 2024-01-01 | Stop reason: HOSPADM

## 2024-01-01 RX ORDER — BISACODYL 10 MG
10 SUPPOSITORY, RECTAL RECTAL DAILY PRN
Status: DISCONTINUED | OUTPATIENT
Start: 2024-01-01 | End: 2024-01-01 | Stop reason: HOSPADM

## 2024-01-01 RX ORDER — ACETAMINOPHEN 650 MG/1
650 SUPPOSITORY RECTAL EVERY 6 HOURS PRN
Status: DISCONTINUED | OUTPATIENT
Start: 2024-01-01 | End: 2024-01-01

## 2024-01-01 RX ORDER — SODIUM CHLORIDE 9 MG/ML
INJECTION, SOLUTION INTRAVENOUS CONTINUOUS
Status: DISCONTINUED | OUTPATIENT
Start: 2024-01-01 | End: 2024-01-01 | Stop reason: HOSPADM

## 2024-01-01 RX ORDER — ALBUMIN, HUMAN INJ 5% 5 %
12.5 SOLUTION INTRAVENOUS ONCE
Status: COMPLETED | OUTPATIENT
Start: 2024-01-01 | End: 2024-01-01

## 2024-01-01 RX ORDER — HEPARIN SODIUM 1000 [USP'U]/ML
INJECTION, SOLUTION INTRAVENOUS; SUBCUTANEOUS PRN
Status: DISCONTINUED | OUTPATIENT
Start: 2024-01-01 | End: 2024-01-01 | Stop reason: SDUPTHER

## 2024-01-01 RX ORDER — SENNA AND DOCUSATE SODIUM 50; 8.6 MG/1; MG/1
1 TABLET, FILM COATED ORAL 2 TIMES DAILY
Status: DISCONTINUED | OUTPATIENT
Start: 2024-01-01 | End: 2024-01-01 | Stop reason: HOSPADM

## 2024-01-01 RX ORDER — AMIODARONE HYDROCHLORIDE 50 MG/ML
INJECTION, SOLUTION INTRAVENOUS
Status: DISPENSED
Start: 2024-01-01 | End: 2024-01-01

## 2024-01-01 RX ORDER — ALBUMIN, HUMAN INJ 5% 5 %
SOLUTION INTRAVENOUS PRN
Status: DISCONTINUED | OUTPATIENT
Start: 2024-01-01 | End: 2024-01-01 | Stop reason: SDUPTHER

## 2024-01-01 RX ORDER — ALBUMIN, HUMAN INJ 5% 5 %
12.5 SOLUTION INTRAVENOUS PRN
Status: COMPLETED | OUTPATIENT
Start: 2024-01-01 | End: 2024-01-01

## 2024-01-01 RX ORDER — HEPARIN SODIUM 1000 [USP'U]/ML
4000 INJECTION, SOLUTION INTRAVENOUS; SUBCUTANEOUS PRN
Status: DISCONTINUED | OUTPATIENT
Start: 2024-01-01 | End: 2024-01-01 | Stop reason: HOSPADM

## 2024-01-01 RX ORDER — ONDANSETRON 4 MG/1
4 TABLET, ORALLY DISINTEGRATING ORAL EVERY 8 HOURS PRN
Status: DISCONTINUED | OUTPATIENT
Start: 2024-01-01 | End: 2024-01-01 | Stop reason: HOSPADM

## 2024-01-01 RX ORDER — MIDAZOLAM HYDROCHLORIDE 2 MG/2ML
0.5 INJECTION, SOLUTION INTRAMUSCULAR; INTRAVENOUS EVERY 30 MIN PRN
Status: DISCONTINUED | OUTPATIENT
Start: 2024-01-01 | End: 2024-01-01 | Stop reason: HOSPADM

## 2024-01-01 RX ORDER — SODIUM CHLORIDE 9 MG/ML
INJECTION, SOLUTION INTRAVENOUS PRN
Status: CANCELLED | OUTPATIENT
Start: 2024-01-01

## 2024-01-01 RX ORDER — DEXMEDETOMIDINE HYDROCHLORIDE 4 UG/ML
.1-1.5 INJECTION, SOLUTION INTRAVENOUS CONTINUOUS
Status: DISCONTINUED | OUTPATIENT
Start: 2024-01-01 | End: 2024-01-01 | Stop reason: HOSPADM

## 2024-01-01 RX ORDER — INSULIN GLARGINE 100 [IU]/ML
1-50 INJECTION, SOLUTION SUBCUTANEOUS
Status: ACTIVE | OUTPATIENT
Start: 2024-01-01 | End: 2024-01-01

## 2024-01-01 RX ORDER — CHLORHEXIDINE GLUCONATE ORAL RINSE 1.2 MG/ML
15 SOLUTION DENTAL 2 TIMES DAILY
Status: DISCONTINUED | OUTPATIENT
Start: 2024-01-01 | End: 2024-01-01 | Stop reason: SDUPTHER

## 2024-01-01 RX ORDER — ASPIRIN 81 MG/1
162 TABLET, CHEWABLE ORAL
Status: COMPLETED | OUTPATIENT
Start: 2024-01-01 | End: 2024-01-01

## 2024-01-01 RX ORDER — ONDANSETRON 2 MG/ML
INJECTION INTRAMUSCULAR; INTRAVENOUS PRN
Status: DISCONTINUED | OUTPATIENT
Start: 2024-01-01 | End: 2024-01-01 | Stop reason: SDUPTHER

## 2024-01-01 RX ORDER — EPINEPHRINE 0.1 MG/ML
INJECTION INTRAVENOUS
Status: DISPENSED
Start: 2024-01-01 | End: 2024-01-01

## 2024-01-01 RX ORDER — KETOROLAC TROMETHAMINE 30 MG/ML
15 INJECTION, SOLUTION INTRAMUSCULAR; INTRAVENOUS ONCE
Status: COMPLETED | OUTPATIENT
Start: 2024-01-01 | End: 2024-01-01

## 2024-01-01 RX ORDER — OXYCODONE HYDROCHLORIDE 5 MG/1
10 TABLET ORAL EVERY 4 HOURS PRN
Status: DISCONTINUED | OUTPATIENT
Start: 2024-01-01 | End: 2024-01-01 | Stop reason: HOSPADM

## 2024-01-01 RX ORDER — MIDAZOLAM HYDROCHLORIDE 2 MG/2ML
2 INJECTION, SOLUTION INTRAMUSCULAR; INTRAVENOUS ONCE
Status: COMPLETED | OUTPATIENT
Start: 2024-01-01 | End: 2024-01-01

## 2024-01-01 RX ORDER — SUCCINYLCHOLINE CHLORIDE 20 MG/ML
INJECTION INTRAMUSCULAR; INTRAVENOUS PRN
Status: DISCONTINUED | OUTPATIENT
Start: 2024-01-01 | End: 2024-01-01 | Stop reason: SDUPTHER

## 2024-01-01 RX ORDER — PANTOPRAZOLE SODIUM 40 MG/1
40 TABLET, DELAYED RELEASE ORAL
Status: DISCONTINUED | OUTPATIENT
Start: 2024-01-01 | End: 2024-01-01

## 2024-01-01 RX ORDER — ATORVASTATIN CALCIUM 20 MG/1
20 TABLET, FILM COATED ORAL DAILY
Status: ON HOLD | COMMUNITY
End: 2024-01-01 | Stop reason: HOSPADM

## 2024-01-01 RX ORDER — CHLORHEXIDINE GLUCONATE ORAL RINSE 1.2 MG/ML
15 SOLUTION DENTAL 2 TIMES DAILY
Status: DISCONTINUED | OUTPATIENT
Start: 2024-01-01 | End: 2024-01-01

## 2024-01-01 RX ORDER — CALCIUM CHLORIDE 100 MG/ML
1000 INJECTION INTRAVENOUS; INTRAVENTRICULAR ONCE
Status: DISCONTINUED | OUTPATIENT
Start: 2024-01-01 | End: 2024-01-01 | Stop reason: SDUPTHER

## 2024-01-01 RX ORDER — PROTAMINE SULFATE 10 MG/ML
INJECTION, SOLUTION INTRAVENOUS PRN
Status: DISCONTINUED | OUTPATIENT
Start: 2024-01-01 | End: 2024-01-01 | Stop reason: SDUPTHER

## 2024-01-01 RX ORDER — INSULIN LISPRO 100 [IU]/ML
0-8 INJECTION, SOLUTION INTRAVENOUS; SUBCUTANEOUS
Status: DISCONTINUED | OUTPATIENT
Start: 2024-01-01 | End: 2024-01-01 | Stop reason: HOSPADM

## 2024-01-01 RX ORDER — POLYETHYLENE GLYCOL 3350 17 G/17G
17 POWDER, FOR SOLUTION ORAL DAILY PRN
Status: DISCONTINUED | OUTPATIENT
Start: 2024-01-01 | End: 2024-01-01

## 2024-01-01 RX ORDER — SODIUM CHLORIDE 0.9 % (FLUSH) 0.9 %
5-40 SYRINGE (ML) INJECTION EVERY 12 HOURS SCHEDULED
Status: DISCONTINUED | OUTPATIENT
Start: 2024-01-01 | End: 2024-01-01

## 2024-01-01 RX ORDER — PAPAVERINE HYDROCHLORIDE 30 MG/ML
INJECTION INTRAMUSCULAR; INTRAVENOUS PRN
Status: DISCONTINUED | OUTPATIENT
Start: 2024-01-01 | End: 2024-01-01 | Stop reason: HOSPADM

## 2024-01-01 RX ORDER — SODIUM CHLORIDE 0.9 % (FLUSH) 0.9 %
5-40 SYRINGE (ML) INJECTION PRN
Status: DISCONTINUED | OUTPATIENT
Start: 2024-01-01 | End: 2024-01-01 | Stop reason: HOSPADM

## 2024-01-01 RX ORDER — AMLODIPINE BESYLATE 5 MG/1
5 TABLET ORAL DAILY
Qty: 30 TABLET | Refills: 0
Start: 2024-01-01 | End: 2024-01-15

## 2024-01-01 RX ORDER — SUCCINYLCHOLINE/SOD CL,ISO/PF 200MG/10ML
SYRINGE (ML) INTRAVENOUS PRN
Status: DISCONTINUED | OUTPATIENT
Start: 2024-01-01 | End: 2024-01-01 | Stop reason: SDUPTHER

## 2024-01-01 RX ORDER — LIDOCAINE HYDROCHLORIDE 20 MG/ML
INJECTION, SOLUTION EPIDURAL; INFILTRATION; INTRACAUDAL; PERINEURAL PRN
Status: DISCONTINUED | OUTPATIENT
Start: 2024-01-01 | End: 2024-01-01 | Stop reason: SDUPTHER

## 2024-01-01 RX ORDER — POLYETHYLENE GLYCOL 3350 17 G/17G
17 POWDER, FOR SOLUTION ORAL DAILY
Status: DISCONTINUED | OUTPATIENT
Start: 2024-01-01 | End: 2024-01-01 | Stop reason: HOSPADM

## 2024-01-01 RX ORDER — CALCIUM CHLORIDE 100 MG/ML
INJECTION INTRAVENOUS; INTRAVENTRICULAR PRN
Status: DISCONTINUED | OUTPATIENT
Start: 2024-01-01 | End: 2024-01-01 | Stop reason: SDUPTHER

## 2024-01-01 RX ORDER — DEXMEDETOMIDINE HYDROCHLORIDE 4 UG/ML
INJECTION, SOLUTION INTRAVENOUS CONTINUOUS PRN
Status: DISCONTINUED | OUTPATIENT
Start: 2024-01-01 | End: 2024-01-01 | Stop reason: SDUPTHER

## 2024-01-01 RX ORDER — MIDAZOLAM HYDROCHLORIDE 2 MG/2ML
4 INJECTION, SOLUTION INTRAMUSCULAR; INTRAVENOUS ONCE
Status: COMPLETED | OUTPATIENT
Start: 2024-01-01 | End: 2024-01-01

## 2024-01-01 RX ORDER — MAGNESIUM SULFATE HEPTAHYDRATE 40 MG/ML
INJECTION, SOLUTION INTRAVENOUS PRN
Status: DISCONTINUED | OUTPATIENT
Start: 2024-01-01 | End: 2024-01-01 | Stop reason: SDUPTHER

## 2024-01-01 RX ORDER — HYDROMORPHONE HYDROCHLORIDE 1 MG/ML
0.5 INJECTION, SOLUTION INTRAMUSCULAR; INTRAVENOUS; SUBCUTANEOUS
Status: DISCONTINUED | OUTPATIENT
Start: 2024-01-01 | End: 2024-01-01 | Stop reason: HOSPADM

## 2024-01-01 RX ORDER — AMIODARONE HYDROCHLORIDE 200 MG/1
400 TABLET ORAL 2 TIMES DAILY
Status: DISCONTINUED | OUTPATIENT
Start: 2024-01-01 | End: 2024-01-01 | Stop reason: HOSPADM

## 2024-01-01 RX ORDER — ATORVASTATIN CALCIUM 20 MG/1
40 TABLET, FILM COATED ORAL NIGHTLY
Status: DISCONTINUED | OUTPATIENT
Start: 2024-01-01 | End: 2024-01-01 | Stop reason: HOSPADM

## 2024-01-01 RX ORDER — NITROGLYCERIN 0.4 MG/1
0.4 TABLET SUBLINGUAL EVERY 5 MIN PRN
Status: DISCONTINUED | OUTPATIENT
Start: 2024-01-01 | End: 2024-01-01 | Stop reason: HOSPADM

## 2024-01-01 RX ORDER — CHLORHEXIDINE GLUCONATE ORAL RINSE 1.2 MG/ML
15 SOLUTION DENTAL 2 TIMES DAILY
Status: DISCONTINUED | OUTPATIENT
Start: 2024-01-01 | End: 2024-01-01 | Stop reason: HOSPADM

## 2024-01-01 RX ORDER — OXYCODONE HYDROCHLORIDE 5 MG/1
5 TABLET ORAL EVERY 4 HOURS PRN
Status: DISCONTINUED | OUTPATIENT
Start: 2024-01-01 | End: 2024-01-01 | Stop reason: HOSPADM

## 2024-01-01 RX ORDER — ACETAMINOPHEN 650 MG/1
650 SUPPOSITORY RECTAL EVERY 6 HOURS PRN
Status: DISCONTINUED | OUTPATIENT
Start: 2024-01-01 | End: 2024-01-01 | Stop reason: HOSPADM

## 2024-01-01 RX ORDER — ONDANSETRON 4 MG/1
4 TABLET, ORALLY DISINTEGRATING ORAL EVERY 8 HOURS PRN
Status: DISCONTINUED | OUTPATIENT
Start: 2024-01-01 | End: 2024-01-01

## 2024-01-01 RX ORDER — ATORVASTATIN CALCIUM 40 MG/1
40 TABLET, FILM COATED ORAL NIGHTLY
Status: DISCONTINUED | OUTPATIENT
Start: 2024-01-01 | End: 2024-01-01

## 2024-01-01 RX ORDER — PROPOFOL 10 MG/ML
INJECTION, EMULSION INTRAVENOUS PRN
Status: DISCONTINUED | OUTPATIENT
Start: 2024-01-01 | End: 2024-01-01 | Stop reason: SDUPTHER

## 2024-01-01 RX ORDER — HEPARIN SODIUM 1000 [USP'U]/ML
4000 INJECTION, SOLUTION INTRAVENOUS; SUBCUTANEOUS PRN
Status: DISCONTINUED | OUTPATIENT
Start: 2024-01-01 | End: 2024-01-01

## 2024-01-01 RX ORDER — ONDANSETRON 2 MG/ML
4 INJECTION INTRAMUSCULAR; INTRAVENOUS EVERY 6 HOURS PRN
Status: DISCONTINUED | OUTPATIENT
Start: 2024-01-01 | End: 2024-01-01 | Stop reason: HOSPADM

## 2024-01-01 RX ORDER — DIPHENHYDRAMINE HCL 25 MG
25 CAPSULE ORAL NIGHTLY PRN
Status: DISCONTINUED | OUTPATIENT
Start: 2024-01-01 | End: 2024-01-01 | Stop reason: HOSPADM

## 2024-01-01 RX ORDER — LIDOCAINE HYDROCHLORIDE 10 MG/ML
INJECTION, SOLUTION INFILTRATION; PERINEURAL PRN
Status: DISCONTINUED | OUTPATIENT
Start: 2024-01-01 | End: 2024-01-01 | Stop reason: HOSPADM

## 2024-01-01 RX ORDER — LANOLIN ALCOHOL/MO/W.PET/CERES
400 CREAM (GRAM) TOPICAL 2 TIMES DAILY
Status: DISCONTINUED | OUTPATIENT
Start: 2024-01-01 | End: 2024-01-01 | Stop reason: HOSPADM

## 2024-01-01 RX ORDER — ASPIRIN 81 MG/1
81 TABLET ORAL DAILY
Status: DISCONTINUED | OUTPATIENT
Start: 2024-01-01 | End: 2024-01-01 | Stop reason: HOSPADM

## 2024-01-01 RX ORDER — ALBUMIN, HUMAN INJ 5% 5 %
SOLUTION INTRAVENOUS
Status: COMPLETED
Start: 2024-01-01 | End: 2024-01-01

## 2024-01-01 RX ORDER — CEFAZOLIN SODIUM 1 G/3ML
INJECTION, POWDER, FOR SOLUTION INTRAMUSCULAR; INTRAVENOUS PRN
Status: DISCONTINUED | OUTPATIENT
Start: 2024-01-01 | End: 2024-01-01 | Stop reason: HOSPADM

## 2024-01-01 RX ORDER — HYDROMORPHONE HYDROCHLORIDE 1 MG/ML
1 INJECTION, SOLUTION INTRAMUSCULAR; INTRAVENOUS; SUBCUTANEOUS ONCE
Status: COMPLETED | OUTPATIENT
Start: 2024-01-01 | End: 2024-01-01

## 2024-01-01 RX ORDER — HEPARIN SODIUM 1000 [USP'U]/ML
2000 INJECTION, SOLUTION INTRAVENOUS; SUBCUTANEOUS PRN
Status: DISCONTINUED | OUTPATIENT
Start: 2024-01-01 | End: 2024-01-01 | Stop reason: HOSPADM

## 2024-01-01 RX ORDER — HYDROMORPHONE HYDROCHLORIDE 1 MG/ML
0.5 INJECTION, SOLUTION INTRAMUSCULAR; INTRAVENOUS; SUBCUTANEOUS EVERY 4 HOURS PRN
Status: DISCONTINUED | OUTPATIENT
Start: 2024-01-01 | End: 2024-01-01 | Stop reason: HOSPADM

## 2024-01-01 RX ORDER — ACETAMINOPHEN 325 MG/1
650 TABLET ORAL EVERY 6 HOURS PRN
Status: DISCONTINUED | OUTPATIENT
Start: 2024-01-01 | End: 2024-01-01

## 2024-01-01 RX ORDER — NOREPINEPHRINE BITARTRATE 0.06 MG/ML
INJECTION, SOLUTION INTRAVENOUS
Status: COMPLETED
Start: 2024-01-01 | End: 2024-01-01

## 2024-01-01 RX ORDER — HEPARIN SODIUM 10000 [USP'U]/100ML
5-30 INJECTION, SOLUTION INTRAVENOUS CONTINUOUS
Qty: 100 ML | Refills: 0 | Status: SHIPPED | OUTPATIENT
Start: 2024-01-01 | End: 2024-01-15

## 2024-01-01 RX ORDER — HYDRALAZINE HYDROCHLORIDE 20 MG/ML
10 INJECTION INTRAMUSCULAR; INTRAVENOUS EVERY 6 HOURS PRN
Status: DISCONTINUED | OUTPATIENT
Start: 2024-01-01 | End: 2024-01-01 | Stop reason: HOSPADM

## 2024-01-01 RX ORDER — HEPARIN SODIUM 200 [USP'U]/100ML
INJECTION, SOLUTION INTRAVENOUS CONTINUOUS PRN
Status: COMPLETED | OUTPATIENT
Start: 2024-01-01 | End: 2024-01-01

## 2024-01-01 RX ORDER — MIDAZOLAM HYDROCHLORIDE 1 MG/ML
INJECTION INTRAMUSCULAR; INTRAVENOUS PRN
Status: DISCONTINUED | OUTPATIENT
Start: 2024-01-01 | End: 2024-01-01 | Stop reason: SDUPTHER

## 2024-01-01 RX ORDER — FENTANYL CITRATE 50 UG/ML
100 INJECTION, SOLUTION INTRAMUSCULAR; INTRAVENOUS ONCE
Status: COMPLETED | OUTPATIENT
Start: 2024-01-01 | End: 2024-01-01

## 2024-01-01 RX ORDER — ONDANSETRON 2 MG/ML
4 INJECTION INTRAMUSCULAR; INTRAVENOUS EVERY 4 HOURS PRN
Status: DISCONTINUED | OUTPATIENT
Start: 2024-01-01 | End: 2024-01-01 | Stop reason: HOSPADM

## 2024-01-01 RX ORDER — ATROPINE SULFATE 0.1 MG/ML
INJECTION INTRAVENOUS
Status: DISPENSED
Start: 2024-01-01 | End: 2024-01-01

## 2024-01-01 RX ORDER — MORPHINE SULFATE 2 MG/ML
1 INJECTION, SOLUTION INTRAMUSCULAR; INTRAVENOUS EVERY 4 HOURS PRN
Status: DISCONTINUED | OUTPATIENT
Start: 2024-01-01 | End: 2024-01-01 | Stop reason: HOSPADM

## 2024-01-01 RX ORDER — ASPIRIN 81 MG/1
81 TABLET ORAL DAILY
Qty: 30 TABLET | Refills: 0
Start: 2024-01-01 | End: 2024-01-15

## 2024-01-01 RX ORDER — LIDOCAINE 4 G/G
2 PATCH TOPICAL DAILY
Status: DISCONTINUED | OUTPATIENT
Start: 2024-01-01 | End: 2024-01-01 | Stop reason: HOSPADM

## 2024-01-01 RX ORDER — PROPOFOL 10 MG/ML
5-50 INJECTION, EMULSION INTRAVENOUS CONTINUOUS
Status: DISCONTINUED | OUTPATIENT
Start: 2024-01-01 | End: 2024-01-01 | Stop reason: HOSPADM

## 2024-01-01 RX ORDER — ALBUTEROL SULFATE 2.5 MG/3ML
2.5 SOLUTION RESPIRATORY (INHALATION) EVERY 4 HOURS PRN
Status: DISCONTINUED | OUTPATIENT
Start: 2024-01-01 | End: 2024-01-01 | Stop reason: HOSPADM

## 2024-01-01 RX ORDER — SODIUM CHLORIDE, SODIUM LACTATE, POTASSIUM CHLORIDE, CALCIUM CHLORIDE 600; 310; 30; 20 MG/100ML; MG/100ML; MG/100ML; MG/100ML
INJECTION, SOLUTION INTRAVENOUS CONTINUOUS PRN
Status: DISCONTINUED | OUTPATIENT
Start: 2024-01-01 | End: 2024-01-01 | Stop reason: SDUPTHER

## 2024-01-01 RX ORDER — GLYCOPYRROLATE 0.2 MG/ML
0.2 INJECTION INTRAMUSCULAR; INTRAVENOUS EVERY 4 HOURS PRN
Status: DISCONTINUED | OUTPATIENT
Start: 2024-01-01 | End: 2024-01-01 | Stop reason: HOSPADM

## 2024-01-01 RX ORDER — AMLODIPINE BESYLATE 5 MG/1
5 TABLET ORAL DAILY
Status: DISCONTINUED | OUTPATIENT
Start: 2024-01-01 | End: 2024-01-01 | Stop reason: HOSPADM

## 2024-01-01 RX ORDER — HEPARIN SODIUM 10000 [USP'U]/100ML
14-30 INJECTION, SOLUTION INTRAVENOUS CONTINUOUS
Status: DISCONTINUED | OUTPATIENT
Start: 2024-01-01 | End: 2024-01-01

## 2024-01-01 RX ORDER — SODIUM CHLORIDE 0.9 % (FLUSH) 0.9 %
5-40 SYRINGE (ML) INJECTION EVERY 12 HOURS SCHEDULED
Status: DISCONTINUED | OUTPATIENT
Start: 2024-01-01 | End: 2024-01-01 | Stop reason: HOSPADM

## 2024-01-01 RX ORDER — CALCIUM GLUCONATE 20 MG/ML
1000 INJECTION, SOLUTION INTRAVENOUS ONCE
Status: DISCONTINUED | OUTPATIENT
Start: 2024-01-01 | End: 2024-01-01 | Stop reason: HOSPADM

## 2024-01-01 RX ORDER — FENTANYL CITRATE 50 UG/ML
25 INJECTION, SOLUTION INTRAMUSCULAR; INTRAVENOUS
Status: DISCONTINUED | OUTPATIENT
Start: 2024-01-01 | End: 2024-01-01 | Stop reason: HOSPADM

## 2024-01-01 RX ORDER — FENTANYL CITRATE 50 UG/ML
INJECTION, SOLUTION INTRAMUSCULAR; INTRAVENOUS PRN
Status: DISCONTINUED | OUTPATIENT
Start: 2024-01-01 | End: 2024-01-01 | Stop reason: SDUPTHER

## 2024-01-01 RX ORDER — NOREPINEPHRINE BITARTRATE 0.06 MG/ML
1-100 INJECTION, SOLUTION INTRAVENOUS CONTINUOUS
Status: DISCONTINUED | OUTPATIENT
Start: 2024-01-01 | End: 2024-01-01 | Stop reason: HOSPADM

## 2024-01-01 RX ORDER — DESMOPRESSIN ACETATE 4 UG/ML
INJECTION, SOLUTION INTRAVENOUS; SUBCUTANEOUS PRN
Status: DISCONTINUED | OUTPATIENT
Start: 2024-01-01 | End: 2024-01-01 | Stop reason: SDUPTHER

## 2024-01-01 RX ORDER — ONDANSETRON 2 MG/ML
4 INJECTION INTRAMUSCULAR; INTRAVENOUS EVERY 6 HOURS PRN
Status: DISCONTINUED | OUTPATIENT
Start: 2024-01-01 | End: 2024-01-01

## 2024-01-01 RX ORDER — CALCIUM GLUCONATE 20 MG/ML
2000 INJECTION, SOLUTION INTRAVENOUS ONCE
Status: DISCONTINUED | OUTPATIENT
Start: 2024-01-01 | End: 2024-01-01 | Stop reason: SDUPTHER

## 2024-01-01 RX ORDER — MIDAZOLAM HYDROCHLORIDE 2 MG/2ML
1 INJECTION, SOLUTION INTRAMUSCULAR; INTRAVENOUS
Status: DISCONTINUED | OUTPATIENT
Start: 2024-01-01 | End: 2024-01-01 | Stop reason: HOSPADM

## 2024-01-01 RX ORDER — FAMOTIDINE 20 MG/1
20 TABLET, FILM COATED ORAL 2 TIMES DAILY
Status: DISCONTINUED | OUTPATIENT
Start: 2024-01-01 | End: 2024-01-01 | Stop reason: SDUPTHER

## 2024-01-01 RX ORDER — GLYCOPYRROLATE 0.2 MG/ML
0.2 INJECTION INTRAMUSCULAR; INTRAVENOUS ONCE
Status: DISCONTINUED | OUTPATIENT
Start: 2024-01-01 | End: 2024-01-01 | Stop reason: HOSPADM

## 2024-01-01 RX ORDER — ACETAMINOPHEN 325 MG/1
650 TABLET ORAL EVERY 4 HOURS PRN
Status: CANCELLED | OUTPATIENT
Start: 2024-01-01

## 2024-01-01 RX ORDER — SODIUM CHLORIDE 0.9 % (FLUSH) 0.9 %
5-40 SYRINGE (ML) INJECTION PRN
Status: CANCELLED | OUTPATIENT
Start: 2024-01-01

## 2024-01-01 RX ORDER — HEPARIN SODIUM 1000 [USP'U]/ML
4000 INJECTION, SOLUTION INTRAVENOUS; SUBCUTANEOUS ONCE
Status: COMPLETED | OUTPATIENT
Start: 2024-01-01 | End: 2024-01-01

## 2024-01-01 RX ORDER — ATORVASTATIN CALCIUM 40 MG/1
40 TABLET, FILM COATED ORAL NIGHTLY
Status: DISCONTINUED | OUTPATIENT
Start: 2024-01-01 | End: 2024-01-01 | Stop reason: HOSPADM

## 2024-01-01 RX ORDER — NITROGLYCERIN 0.4 MG/1
0.4 TABLET SUBLINGUAL EVERY 5 MIN PRN
Qty: 25 TABLET | Refills: 0 | Status: SHIPPED | OUTPATIENT
Start: 2024-01-01 | End: 2024-01-15

## 2024-01-01 RX ORDER — DOBUTAMINE HYDROCHLORIDE 200 MG/100ML
2.25 INJECTION INTRAVENOUS CONTINUOUS
Status: DISCONTINUED | OUTPATIENT
Start: 2024-01-01 | End: 2024-01-01

## 2024-01-01 RX ORDER — MAGNESIUM SULFATE IN WATER 40 MG/ML
2000 INJECTION, SOLUTION INTRAVENOUS PRN
Status: DISCONTINUED | OUTPATIENT
Start: 2024-01-01 | End: 2024-01-01

## 2024-01-01 RX ORDER — HEPARIN SODIUM 1000 [USP'U]/ML
2000 INJECTION, SOLUTION INTRAVENOUS; SUBCUTANEOUS PRN
Status: DISCONTINUED | OUTPATIENT
Start: 2024-01-01 | End: 2024-01-01

## 2024-01-01 RX ORDER — ROPIVACAINE HYDROCHLORIDE 5 MG/ML
INJECTION, SOLUTION EPIDURAL; INFILTRATION; PERINEURAL PRN
Status: DISCONTINUED | OUTPATIENT
Start: 2024-01-01 | End: 2024-01-01 | Stop reason: HOSPADM

## 2024-01-01 RX ORDER — GLUCAGON 1 MG/ML
1 KIT INJECTION PRN
Status: DISCONTINUED | OUTPATIENT
Start: 2024-01-01 | End: 2024-01-01 | Stop reason: HOSPADM

## 2024-01-01 RX ORDER — POTASSIUM CHLORIDE 7.45 MG/ML
10 INJECTION INTRAVENOUS PRN
Status: DISCONTINUED | OUTPATIENT
Start: 2024-01-01 | End: 2024-01-01

## 2024-01-01 RX ORDER — HEPARIN SODIUM 10000 [USP'U]/100ML
5-30 INJECTION, SOLUTION INTRAVENOUS CONTINUOUS
Status: DISCONTINUED | OUTPATIENT
Start: 2024-01-01 | End: 2024-01-01 | Stop reason: HOSPADM

## 2024-01-01 RX ORDER — CEFAZOLIN SODIUM 1 G/3ML
INJECTION, POWDER, FOR SOLUTION INTRAMUSCULAR; INTRAVENOUS PRN
Status: DISCONTINUED | OUTPATIENT
Start: 2024-01-01 | End: 2024-01-01 | Stop reason: SDUPTHER

## 2024-01-01 RX ORDER — SODIUM CHLORIDE 0.9 % (FLUSH) 0.9 %
5-40 SYRINGE (ML) INJECTION EVERY 12 HOURS SCHEDULED
Status: CANCELLED | OUTPATIENT
Start: 2024-01-01

## 2024-01-01 RX ORDER — INSULIN LISPRO 100 [IU]/ML
0-4 INJECTION, SOLUTION INTRAVENOUS; SUBCUTANEOUS NIGHTLY
Status: DISCONTINUED | OUTPATIENT
Start: 2024-01-01 | End: 2024-01-01 | Stop reason: HOSPADM

## 2024-01-01 RX ORDER — ETOMIDATE 2 MG/ML
INJECTION INTRAVENOUS PRN
Status: DISCONTINUED | OUTPATIENT
Start: 2024-01-01 | End: 2024-01-01 | Stop reason: SDUPTHER

## 2024-01-01 RX ORDER — POTASSIUM CHLORIDE 29.8 MG/ML
20 INJECTION INTRAVENOUS PRN
Status: DISCONTINUED | OUTPATIENT
Start: 2024-01-01 | End: 2024-01-01 | Stop reason: HOSPADM

## 2024-01-01 RX ADMIN — FENTANYL CITRATE 250 MCG: 0.05 INJECTION, SOLUTION INTRAMUSCULAR; INTRAVENOUS at 08:31

## 2024-01-01 RX ADMIN — SODIUM CHLORIDE 1.51 UNITS/HR: 9 INJECTION, SOLUTION INTRAVENOUS at 15:49

## 2024-01-01 RX ADMIN — DEXMEDETOMIDINE HYDROCHLORIDE 0.8 MCG/KG/HR: 400 INJECTION, SOLUTION INTRAVENOUS at 06:30

## 2024-01-01 RX ADMIN — SODIUM CHLORIDE: 4.5 INJECTION, SOLUTION INTRAVENOUS at 13:01

## 2024-01-01 RX ADMIN — MIDAZOLAM 1 MG: 1 INJECTION INTRAMUSCULAR; INTRAVENOUS at 02:12

## 2024-01-01 RX ADMIN — PROPOFOL 20 MCG/KG/MIN: 10 INJECTION, EMULSION INTRAVENOUS at 06:00

## 2024-01-01 RX ADMIN — SODIUM BICARBONATE 50 MEQ: 84 INJECTION INTRAVENOUS at 20:04

## 2024-01-01 RX ADMIN — SODIUM BICARBONATE: 84 INJECTION, SOLUTION INTRAVENOUS at 07:45

## 2024-01-01 RX ADMIN — CALCIUM CHLORIDE 1000 MG: 100 INJECTION INTRAVENOUS; INTRAVENTRICULAR at 14:07

## 2024-01-01 RX ADMIN — ALBUMIN (HUMAN) 25 G: 12.5 INJECTION, SOLUTION INTRAVENOUS at 23:41

## 2024-01-01 RX ADMIN — DEXMEDETOMIDINE HYDROCHLORIDE 0.5 MCG/KG/HR: 400 INJECTION, SOLUTION INTRAVENOUS at 10:06

## 2024-01-01 RX ADMIN — PROPOFOL 100 MG: 10 INJECTION, EMULSION INTRAVENOUS at 07:46

## 2024-01-01 RX ADMIN — NOREPINEPHRINE BITARTRATE 5 MCG/MIN: 1 SOLUTION INTRAVENOUS at 21:36

## 2024-01-01 RX ADMIN — SODIUM BICARBONATE 50 MEQ: 84 INJECTION INTRAVENOUS at 14:08

## 2024-01-01 RX ADMIN — ALBUMIN (HUMAN) 12.5 G: 12.5 INJECTION, SOLUTION INTRAVENOUS at 17:20

## 2024-01-01 RX ADMIN — MIDAZOLAM 4 MG: 1 INJECTION INTRAMUSCULAR; INTRAVENOUS at 16:07

## 2024-01-01 RX ADMIN — CALCIUM CHLORIDE 0.5 G: 100 INJECTION, SOLUTION INTRAVENOUS; INTRAVENTRICULAR at 01:26

## 2024-01-01 RX ADMIN — MIDAZOLAM HYDROCHLORIDE 2 MG: 1 INJECTION, SOLUTION INTRAMUSCULAR; INTRAVENOUS at 06:40

## 2024-01-01 RX ADMIN — ALBUMIN (HUMAN) 12.5 G: 12.5 INJECTION, SOLUTION INTRAVENOUS at 11:30

## 2024-01-01 RX ADMIN — SODIUM CHLORIDE 100 MCG: 9 INJECTION, SOLUTION INTRAVENOUS at 10:59

## 2024-01-01 RX ADMIN — CALCIUM CHLORIDE 1000 MG: 100 INJECTION INTRAVENOUS; INTRAVENTRICULAR at 23:42

## 2024-01-01 RX ADMIN — SODIUM CHLORIDE 10 G/HR: 900 INJECTION, SOLUTION INTRAVENOUS at 07:50

## 2024-01-01 RX ADMIN — PHENYLEPHRINE HYDROCHLORIDE 40 MCG/MIN: 10 INJECTION INTRAVENOUS at 09:39

## 2024-01-01 RX ADMIN — WATER 2000 MG: 1 INJECTION INTRAMUSCULAR; INTRAVENOUS; SUBCUTANEOUS at 00:10

## 2024-01-01 RX ADMIN — WATER 2000 MG: 1 INJECTION INTRAMUSCULAR; INTRAVENOUS; SUBCUTANEOUS at 11:12

## 2024-01-01 RX ADMIN — ROCURONIUM BROMIDE 50 MG: 10 INJECTION, SOLUTION INTRAVENOUS at 02:52

## 2024-01-01 RX ADMIN — INSULIN HUMAN 3 UNITS: 100 INJECTION, SOLUTION PARENTERAL at 08:22

## 2024-01-01 RX ADMIN — MUPIROCIN: 20 OINTMENT TOPICAL at 08:32

## 2024-01-01 RX ADMIN — SODIUM CHLORIDE, PRESERVATIVE FREE 20 MG: 5 INJECTION INTRAVENOUS at 08:57

## 2024-01-01 RX ADMIN — DEXTROSE MONOHYDRATE 25 G: 25 INJECTION, SOLUTION INTRAVENOUS at 23:23

## 2024-01-01 RX ADMIN — HYDROMORPHONE HYDROCHLORIDE 1 MG: 1 INJECTION, SOLUTION INTRAMUSCULAR; INTRAVENOUS; SUBCUTANEOUS at 16:08

## 2024-01-01 RX ADMIN — SODIUM BICARBONATE 100 MEQ: 84 INJECTION INTRAVENOUS at 18:59

## 2024-01-01 RX ADMIN — SODIUM BICARBONATE 50 MEQ: 84 INJECTION INTRAVENOUS at 21:45

## 2024-01-01 RX ADMIN — MAGNESIUM SULFATE HEPTAHYDRATE 2000 MG: 40 INJECTION, SOLUTION INTRAVENOUS at 10:11

## 2024-01-01 RX ADMIN — MUPIROCIN: 20 OINTMENT TOPICAL at 14:14

## 2024-01-01 RX ADMIN — MUPIROCIN: 20 OINTMENT TOPICAL at 20:52

## 2024-01-01 RX ADMIN — DEXAMETHASONE SODIUM PHOSPHATE 4 MG: 4 INJECTION INTRA-ARTICULAR; INTRALESIONAL; INTRAMUSCULAR; INTRAVENOUS; SOFT TISSUE at 07:46

## 2024-01-01 RX ADMIN — DEXMEDETOMIDINE HYDROCHLORIDE 0.5 MCG/KG/HR: 400 INJECTION, SOLUTION INTRAVENOUS at 12:30

## 2024-01-01 RX ADMIN — CALCIUM CHLORIDE 1000 MG: 100 INJECTION INTRAVENOUS; INTRAVENTRICULAR at 08:11

## 2024-01-01 RX ADMIN — ALBUMIN (HUMAN) 25 G: 12.5 INJECTION, SOLUTION INTRAVENOUS at 20:30

## 2024-01-01 RX ADMIN — HYDROMORPHONE HYDROCHLORIDE 0.5 MG: 1 INJECTION, SOLUTION INTRAMUSCULAR; INTRAVENOUS; SUBCUTANEOUS at 11:10

## 2024-01-01 RX ADMIN — ROCURONIUM BROMIDE 50 MG: 10 INJECTION, SOLUTION INTRAVENOUS at 01:35

## 2024-01-01 RX ADMIN — SODIUM BICARBONATE 50 MEQ: 84 INJECTION, SOLUTION INTRAVENOUS at 00:14

## 2024-01-01 RX ADMIN — ROCURONIUM BROMIDE 45 MG: 10 INJECTION, SOLUTION INTRAVENOUS at 07:50

## 2024-01-01 RX ADMIN — SODIUM CHLORIDE, PRESERVATIVE FREE 20 MG: 5 INJECTION INTRAVENOUS at 21:32

## 2024-01-01 RX ADMIN — SODIUM CHLORIDE, PRESERVATIVE FREE 20 MG: 5 INJECTION INTRAVENOUS at 13:45

## 2024-01-01 RX ADMIN — SODIUM CHLORIDE, POTASSIUM CHLORIDE, SODIUM LACTATE AND CALCIUM CHLORIDE: 600; 310; 30; 20 INJECTION, SOLUTION INTRAVENOUS at 01:23

## 2024-01-01 RX ADMIN — ALBUMIN (HUMAN) 250 ML: 12.5 INJECTION, SOLUTION INTRAVENOUS at 00:01

## 2024-01-01 RX ADMIN — SODIUM BICARBONATE 50 MEQ: 84 INJECTION INTRAVENOUS at 07:28

## 2024-01-01 RX ADMIN — DEXMEDETOMIDINE HYDROCHLORIDE 0.4 MCG/KG/HR: 400 INJECTION, SOLUTION INTRAVENOUS at 12:04

## 2024-01-01 RX ADMIN — SODIUM CHLORIDE: 9 INJECTION, SOLUTION INTRAVENOUS at 07:32

## 2024-01-01 RX ADMIN — WATER 2000 MG: 1 INJECTION INTRAMUSCULAR; INTRAVENOUS; SUBCUTANEOUS at 17:05

## 2024-01-01 RX ADMIN — Medication 50 MEQ: at 23:49

## 2024-01-01 RX ADMIN — SODIUM CHLORIDE, PRESERVATIVE FREE 20 MG: 5 INJECTION INTRAVENOUS at 10:31

## 2024-01-01 RX ADMIN — CEFAZOLIN 2 G: 1 INJECTION, POWDER, FOR SOLUTION INTRAMUSCULAR; INTRAVENOUS at 08:10

## 2024-01-01 RX ADMIN — SODIUM CHLORIDE: 9 INJECTION, SOLUTION INTRAVENOUS at 00:15

## 2024-01-01 RX ADMIN — ROCURONIUM BROMIDE 50 MG: 10 INJECTION, SOLUTION INTRAVENOUS at 09:30

## 2024-01-01 RX ADMIN — WATER 2000 MG: 1 INJECTION INTRAMUSCULAR; INTRAVENOUS; SUBCUTANEOUS at 16:37

## 2024-01-01 RX ADMIN — Medication 2500 MG: at 22:15

## 2024-01-01 RX ADMIN — VASOPRESSIN 0.04 UNITS/MIN: 20 INJECTION INTRAVENOUS at 04:00

## 2024-01-01 RX ADMIN — AMLODIPINE BESYLATE 5 MG: 5 TABLET ORAL at 08:24

## 2024-01-01 RX ADMIN — VASOPRESSIN 0.01 UNITS/MIN: 20 INJECTION INTRAVENOUS at 11:39

## 2024-01-01 RX ADMIN — ROCURONIUM BROMIDE 50 MG: 10 INJECTION, SOLUTION INTRAVENOUS at 08:30

## 2024-01-01 RX ADMIN — HEPARIN SODIUM 1000 UNITS: 1000 INJECTION, SOLUTION INTRAVENOUS; SUBCUTANEOUS at 08:29

## 2024-01-01 RX ADMIN — SODIUM CHLORIDE, POTASSIUM CHLORIDE, SODIUM LACTATE AND CALCIUM CHLORIDE: 600; 310; 30; 20 INJECTION, SOLUTION INTRAVENOUS at 07:32

## 2024-01-01 RX ADMIN — PROPOFOL 50 MG: 10 INJECTION, EMULSION INTRAVENOUS at 09:23

## 2024-01-01 RX ADMIN — WATER 50 MG: 1 INJECTION INTRAMUSCULAR; INTRAVENOUS; SUBCUTANEOUS at 09:43

## 2024-01-01 RX ADMIN — ALBUMIN, HUMAN INJ 5% 25 G: 5 SOLUTION at 20:30

## 2024-01-01 RX ADMIN — WATER 100 MG: 1 INJECTION INTRAMUSCULAR; INTRAVENOUS; SUBCUTANEOUS at 21:32

## 2024-01-01 RX ADMIN — DEXMEDETOMIDINE HYDROCHLORIDE 0.6 MCG/KG/HR: 400 INJECTION, SOLUTION INTRAVENOUS at 04:15

## 2024-01-01 RX ADMIN — ONDANSETRON 4 MG: 2 INJECTION INTRAMUSCULAR; INTRAVENOUS at 11:16

## 2024-01-01 RX ADMIN — MUPIROCIN: 20 OINTMENT TOPICAL at 20:02

## 2024-01-01 RX ADMIN — ALBUMIN (HUMAN) 250 ML: 12.5 INJECTION, SOLUTION INTRAVENOUS at 00:30

## 2024-01-01 RX ADMIN — SODIUM BICARBONATE 50 MEQ: 84 INJECTION INTRAVENOUS at 14:06

## 2024-01-01 RX ADMIN — CHLORHEXIDINE GLUCONATE 15 ML: 1.2 RINSE ORAL at 20:53

## 2024-01-01 RX ADMIN — CALCIUM CHLORIDE INJECTION 1000 MG: 100 INJECTION, SOLUTION INTRAVENOUS at 04:34

## 2024-01-01 RX ADMIN — SODIUM BICARBONATE 50 MEQ: 84 INJECTION INTRAVENOUS at 08:12

## 2024-01-01 RX ADMIN — ACETAMINOPHEN 975 MG: 325 TABLET ORAL at 13:45

## 2024-01-01 RX ADMIN — Medication 50 MEQ: at 14:46

## 2024-01-01 RX ADMIN — HEPARIN SODIUM 3 UNITS/HR: 5000 INJECTION INTRAVENOUS; SUBCUTANEOUS at 18:41

## 2024-01-01 RX ADMIN — DOCUSATE SODIUM 50MG AND SENNOSIDES 8.6MG 1 TABLET: 8.6; 5 TABLET, FILM COATED ORAL at 20:52

## 2024-01-01 RX ADMIN — SODIUM CHLORIDE 100 MCG: 9 INJECTION, SOLUTION INTRAVENOUS at 08:41

## 2024-01-01 RX ADMIN — CALCIUM CHLORIDE 2000 MG: 100 INJECTION INTRAVENOUS; INTRAVENTRICULAR at 22:26

## 2024-01-01 RX ADMIN — FENTANYL CITRATE 100 MCG: 0.05 INJECTION, SOLUTION INTRAMUSCULAR; INTRAVENOUS at 08:02

## 2024-01-01 RX ADMIN — PHENYLEPHRINE HYDROCHLORIDE 60 MCG/MIN: 10 INJECTION INTRAVENOUS at 18:45

## 2024-01-01 RX ADMIN — PROTAMINE SULFATE 200 MG: 10 INJECTION, SOLUTION INTRAVENOUS at 10:56

## 2024-01-01 RX ADMIN — DOCUSATE SODIUM 50MG AND SENNOSIDES 8.6MG 1 TABLET: 8.6; 5 TABLET, FILM COATED ORAL at 13:45

## 2024-01-01 RX ADMIN — SODIUM CHLORIDE 100 MCG: 9 INJECTION, SOLUTION INTRAVENOUS at 09:21

## 2024-01-01 RX ADMIN — SODIUM BICARBONATE 50 MEQ: 84 INJECTION INTRAVENOUS at 08:11

## 2024-01-01 RX ADMIN — HYDROMORPHONE HYDROCHLORIDE 0.5 MG: 1 INJECTION, SOLUTION INTRAMUSCULAR; INTRAVENOUS; SUBCUTANEOUS at 06:30

## 2024-01-01 RX ADMIN — PANTOPRAZOLE SODIUM 40 MG: 40 TABLET, DELAYED RELEASE ORAL at 08:24

## 2024-01-01 RX ADMIN — Medication 100 MEQ: at 13:46

## 2024-01-01 RX ADMIN — LIDOCAINE HYDROCHLORIDE 80 MG: 20 INJECTION, SOLUTION EPIDURAL; INFILTRATION; INTRACAUDAL; PERINEURAL at 07:46

## 2024-01-01 RX ADMIN — MIDAZOLAM 1 MG: 1 INJECTION INTRAMUSCULAR; INTRAVENOUS at 01:08

## 2024-01-01 RX ADMIN — SODIUM CHLORIDE, PRESERVATIVE FREE 10 ML: 5 INJECTION INTRAVENOUS at 20:53

## 2024-01-01 RX ADMIN — HYDROMORPHONE HYDROCHLORIDE 0.5 MG: 1 INJECTION, SOLUTION INTRAMUSCULAR; INTRAVENOUS; SUBCUTANEOUS at 09:00

## 2024-01-01 RX ADMIN — ONDANSETRON 4 MG: 2 INJECTION INTRAMUSCULAR; INTRAVENOUS at 06:53

## 2024-01-01 RX ADMIN — PERFLUTREN 1.5 ML: 6.52 INJECTION, SUSPENSION INTRAVENOUS at 13:31

## 2024-01-01 RX ADMIN — MUPIROCIN: 20 OINTMENT TOPICAL at 09:09

## 2024-01-01 RX ADMIN — SODIUM CHLORIDE: 9 INJECTION, SOLUTION INTRAVENOUS at 06:00

## 2024-01-01 RX ADMIN — SODIUM BICARBONATE: 84 INJECTION, SOLUTION INTRAVENOUS at 18:34

## 2024-01-01 RX ADMIN — SODIUM CHLORIDE 6 ML: 9 INJECTION, SOLUTION INTRAVENOUS at 09:38

## 2024-01-01 RX ADMIN — ALBUMIN (HUMAN) 12.5 G: 12.5 INJECTION, SOLUTION INTRAVENOUS at 13:16

## 2024-01-01 RX ADMIN — HEPARIN SODIUM 4000 UNITS: 1000 INJECTION INTRAVENOUS; SUBCUTANEOUS at 13:07

## 2024-01-01 RX ADMIN — HYDROMORPHONE HYDROCHLORIDE 0.5 MG: 1 INJECTION, SOLUTION INTRAMUSCULAR; INTRAVENOUS; SUBCUTANEOUS at 11:39

## 2024-01-01 RX ADMIN — NOREPINEPHRINE BITARTRATE 26 MCG/MIN: 1 SOLUTION INTRAVENOUS at 23:43

## 2024-01-01 RX ADMIN — SODIUM CHLORIDE 200 MCG: 9 INJECTION, SOLUTION INTRAVENOUS at 11:00

## 2024-01-01 RX ADMIN — CALCIUM CHLORIDE 1000 MG: 100 INJECTION INTRAVENOUS; INTRAVENTRICULAR at 12:21

## 2024-01-01 RX ADMIN — Medication 50 MEQ: at 20:04

## 2024-01-01 RX ADMIN — ASPIRIN 81 MG: 81 TABLET ORAL at 08:25

## 2024-01-01 RX ADMIN — ATORVASTATIN CALCIUM 40 MG: 40 TABLET, FILM COATED ORAL at 20:52

## 2024-01-01 RX ADMIN — CEFEPIME 1000 MG: 1 INJECTION, POWDER, FOR SOLUTION INTRAMUSCULAR; INTRAVENOUS at 08:57

## 2024-01-01 RX ADMIN — SODIUM CHLORIDE, PRESERVATIVE FREE 10 ML: 5 INJECTION INTRAVENOUS at 08:32

## 2024-01-01 RX ADMIN — Medication 200 MG: at 07:46

## 2024-01-01 RX ADMIN — Medication 200 MCG: at 09:33

## 2024-01-01 RX ADMIN — HEPARIN SODIUM 8 UNITS/KG/HR: 10000 INJECTION, SOLUTION INTRAVENOUS at 13:08

## 2024-01-01 RX ADMIN — NOREPINEPHRINE BITARTRATE 34 MCG/MIN: 1 SOLUTION INTRAVENOUS at 11:40

## 2024-01-01 RX ADMIN — HYDROMORPHONE HYDROCHLORIDE 0.5 MG: 1 INJECTION, SOLUTION INTRAMUSCULAR; INTRAVENOUS; SUBCUTANEOUS at 13:42

## 2024-01-01 RX ADMIN — CALCIUM CHLORIDE 0.5 G: 100 INJECTION, SOLUTION INTRAVENOUS; INTRAVENTRICULAR at 01:40

## 2024-01-01 RX ADMIN — PROPOFOL 100 MG: 10 INJECTION, EMULSION INTRAVENOUS at 08:31

## 2024-01-01 RX ADMIN — CALCIUM CHLORIDE 1000 MG: 100 INJECTION INTRAVENOUS; INTRAVENTRICULAR at 06:30

## 2024-01-01 RX ADMIN — ASPIRIN 162 MG: 81 TABLET, CHEWABLE ORAL at 12:45

## 2024-01-01 RX ADMIN — SODIUM BICARBONATE 50 MEQ: 84 INJECTION, SOLUTION INTRAVENOUS at 00:03

## 2024-01-01 RX ADMIN — ROCURONIUM BROMIDE 5 MG: 10 INJECTION, SOLUTION INTRAVENOUS at 07:46

## 2024-01-01 RX ADMIN — CHLORHEXIDINE GLUCONATE 15 ML: 1.2 RINSE ORAL at 09:09

## 2024-01-01 RX ADMIN — PROPOFOL 100 MG: 10 INJECTION, EMULSION INTRAVENOUS at 07:48

## 2024-01-01 RX ADMIN — SODIUM BICARBONATE 50 MEQ: 84 INJECTION INTRAVENOUS at 14:46

## 2024-01-01 RX ADMIN — HEPARIN SODIUM 4000 UNITS: 1000 INJECTION INTRAVENOUS; SUBCUTANEOUS at 07:22

## 2024-01-01 RX ADMIN — SODIUM BICARBONATE 50 MEQ: 84 INJECTION INTRAVENOUS at 01:22

## 2024-01-01 RX ADMIN — SODIUM BICARBONATE 50 MEQ: 84 INJECTION INTRAVENOUS at 06:17

## 2024-01-01 RX ADMIN — DEXMEDETOMIDINE HYDROCHLORIDE 0.8 MCG/KG/HR: 400 INJECTION, SOLUTION INTRAVENOUS at 11:00

## 2024-01-01 RX ADMIN — CHLORHEXIDINE GLUCONATE 15 ML: 1.2 RINSE ORAL at 21:00

## 2024-01-01 RX ADMIN — ALBUMIN (HUMAN) 250 ML: 12.5 INJECTION, SOLUTION INTRAVENOUS at 01:37

## 2024-01-01 RX ADMIN — LIDOCAINE HYDROCHLORIDE 50 MG: 20 INJECTION, SOLUTION EPIDURAL; INFILTRATION; INTRACAUDAL; PERINEURAL at 00:00

## 2024-01-01 RX ADMIN — METHYLENE BLUE 0.25 MG/KG/HR: 5 INJECTION INTRAVENOUS at 12:05

## 2024-01-01 RX ADMIN — POTASSIUM CHLORIDE 20 MEQ: 29.8 INJECTION, SOLUTION INTRAVENOUS at 16:11

## 2024-01-01 RX ADMIN — CHLORHEXIDINE GLUCONATE 15 ML: 1.2 RINSE ORAL at 08:33

## 2024-01-01 RX ADMIN — SODIUM CHLORIDE 5 MG/HR: 9 INJECTION, SOLUTION INTRAVENOUS at 08:42

## 2024-01-01 RX ADMIN — SODIUM CHLORIDE 200 MCG: 9 INJECTION, SOLUTION INTRAVENOUS at 09:34

## 2024-01-01 RX ADMIN — ALBUMIN (HUMAN) 12.5 G: 12.5 INJECTION, SOLUTION INTRAVENOUS at 14:23

## 2024-01-01 RX ADMIN — ATORVASTATIN CALCIUM 40 MG: 40 TABLET, FILM COATED ORAL at 20:02

## 2024-01-01 RX ADMIN — SODIUM BICARBONATE 50 MEQ: 84 INJECTION INTRAVENOUS at 23:19

## 2024-01-01 RX ADMIN — SODIUM BICARBONATE 50 MEQ: 84 INJECTION INTRAVENOUS at 12:19

## 2024-01-01 RX ADMIN — EPINEPHRINE 2 MCG/MIN: 1 INJECTION INTRAMUSCULAR; INTRAVENOUS; SUBCUTANEOUS at 18:30

## 2024-01-01 RX ADMIN — CEFAZOLIN 2 G: 1 INJECTION, POWDER, FOR SOLUTION INTRAMUSCULAR; INTRAVENOUS at 11:10

## 2024-01-01 RX ADMIN — SODIUM CHLORIDE 200 MCG: 9 INJECTION, SOLUTION INTRAVENOUS at 09:35

## 2024-01-01 RX ADMIN — SODIUM BICARBONATE 50 MEQ: 84 INJECTION INTRAVENOUS at 10:17

## 2024-01-01 RX ADMIN — HEPARIN SODIUM 45000 UNITS: 1000 INJECTION, SOLUTION INTRAVENOUS; SUBCUTANEOUS at 09:03

## 2024-01-01 RX ADMIN — KETOROLAC TROMETHAMINE 15 MG: 30 INJECTION INTRAMUSCULAR; INTRAVENOUS at 20:02

## 2024-01-01 RX ADMIN — PROPOFOL 50 MG: 10 INJECTION, EMULSION INTRAVENOUS at 10:59

## 2024-01-01 RX ADMIN — SODIUM CHLORIDE, PRESERVATIVE FREE 10 ML: 5 INJECTION INTRAVENOUS at 20:02

## 2024-01-01 RX ADMIN — WATER 50 MG: 1 INJECTION INTRAMUSCULAR; INTRAVENOUS; SUBCUTANEOUS at 14:06

## 2024-01-01 RX ADMIN — DESMOPRESSIN ACETATE 40 MCG: 4 INJECTION, SOLUTION INTRAVENOUS; SUBCUTANEOUS at 11:15

## 2024-01-01 RX ADMIN — SODIUM BICARBONATE 100 MEQ: 84 INJECTION INTRAVENOUS at 13:46

## 2024-01-01 RX ADMIN — SODIUM BICARBONATE 50 MEQ: 84 INJECTION INTRAVENOUS at 22:26

## 2024-01-01 RX ADMIN — VASOPRESSIN 1 UNITS/MIN: 20 INJECTION INTRAVENOUS at 00:00

## 2024-01-01 RX ADMIN — ATORVASTATIN CALCIUM 40 MG: 20 TABLET, FILM COATED ORAL at 22:14

## 2024-01-01 RX ADMIN — SODIUM BICARBONATE 50 MEQ: 84 INJECTION INTRAVENOUS at 10:18

## 2024-01-01 RX ADMIN — CHLORHEXIDINE GLUCONATE 15 ML: 1.2 RINSE ORAL at 14:14

## 2024-01-01 RX ADMIN — SODIUM CHLORIDE: 9 INJECTION, SOLUTION INTRAVENOUS at 00:56

## 2024-01-01 RX ADMIN — SODIUM BICARBONATE 50 MEQ: 84 INJECTION, SOLUTION INTRAVENOUS at 02:04

## 2024-01-01 RX ADMIN — HYDROMORPHONE HYDROCHLORIDE 0.5 MG: 1 INJECTION, SOLUTION INTRAMUSCULAR; INTRAVENOUS; SUBCUTANEOUS at 14:37

## 2024-01-01 RX ADMIN — SUGAMMADEX 200 MG: 100 INJECTION, SOLUTION INTRAVENOUS at 12:25

## 2024-01-01 RX ADMIN — NICARDIPINE HYDROCHLORIDE 10 MG/HR: 25 INJECTION, SOLUTION INTRAVENOUS at 12:30

## 2024-01-01 RX ADMIN — ETOMIDATE 20 MG: 2 INJECTION, SOLUTION INTRAVENOUS at 23:57

## 2024-01-01 RX ADMIN — HEPARIN SODIUM 4000 UNITS: 1000 INJECTION INTRAVENOUS; SUBCUTANEOUS at 02:01

## 2024-01-01 RX ADMIN — ALBUMIN (HUMAN) 12.5 G: 12.5 INJECTION, SOLUTION INTRAVENOUS at 19:08

## 2024-01-01 RX ADMIN — SODIUM CHLORIDE 6 ML: 9 INJECTION, SOLUTION INTRAVENOUS at 12:30

## 2024-01-01 RX ADMIN — SUCCINYLCHOLINE CHLORIDE 120 MG: 20 INJECTION, SOLUTION INTRAMUSCULAR; INTRAVENOUS at 23:57

## 2024-01-01 RX ADMIN — SODIUM CHLORIDE 3 UNITS/HR: 9 INJECTION, SOLUTION INTRAVENOUS at 08:22

## 2024-01-01 RX ADMIN — PHENYLEPHRINE HYDROCHLORIDE 180 MCG/MIN: 10 INJECTION INTRAVENOUS at 22:51

## 2024-01-01 RX ADMIN — Medication 200 MCG: at 09:32

## 2024-01-01 RX ADMIN — MUPIROCIN: 20 OINTMENT TOPICAL at 21:00

## 2024-01-01 RX ADMIN — FENTANYL CITRATE 100 MCG: 50 INJECTION, SOLUTION INTRAMUSCULAR; INTRAVENOUS at 06:40

## 2024-01-01 RX ADMIN — CALCIUM GLUCONATE 1000 MG: 20 INJECTION, SOLUTION INTRAVENOUS at 13:50

## 2024-01-01 RX ADMIN — DEXTROSE MONOHYDRATE 125 ML: 100 INJECTION, SOLUTION INTRAVENOUS at 18:06

## 2024-01-01 RX ADMIN — SODIUM CHLORIDE 0.69 UNITS/HR: 9 INJECTION, SOLUTION INTRAVENOUS at 12:45

## 2024-01-01 RX ADMIN — POTASSIUM CHLORIDE 20 MEQ: 29.8 INJECTION, SOLUTION INTRAVENOUS at 15:00

## 2024-01-01 RX ADMIN — PROPOFOL 100 MG: 10 INJECTION, EMULSION INTRAVENOUS at 10:58

## 2024-01-01 RX ADMIN — SODIUM BICARBONATE 50 MEQ: 84 INJECTION INTRAVENOUS at 03:30

## 2024-01-01 RX ADMIN — DEXTROSE MONOHYDRATE 250 ML: 50 INJECTION, SOLUTION INTRAVENOUS at 08:22

## 2024-01-01 RX ADMIN — PROPOFOL 50 MG: 10 INJECTION, EMULSION INTRAVENOUS at 09:21

## 2024-01-01 RX ADMIN — WATER 2000 MG: 1 INJECTION INTRAMUSCULAR; INTRAVENOUS; SUBCUTANEOUS at 04:44

## 2024-01-01 RX ADMIN — SODIUM BICARBONATE: 84 INJECTION, SOLUTION INTRAVENOUS at 03:30

## 2024-01-01 RX ADMIN — SODIUM CHLORIDE, POTASSIUM CHLORIDE, SODIUM LACTATE AND CALCIUM CHLORIDE: 600; 310; 30; 20 INJECTION, SOLUTION INTRAVENOUS at 01:59

## 2024-01-01 RX ADMIN — HYDROMORPHONE HYDROCHLORIDE 0.5 MG: 1 INJECTION, SOLUTION INTRAMUSCULAR; INTRAVENOUS; SUBCUTANEOUS at 21:32

## 2024-01-01 RX ADMIN — SODIUM CHLORIDE 40 MG: 9 INJECTION INTRAMUSCULAR; INTRAVENOUS; SUBCUTANEOUS at 09:43

## 2024-01-01 RX ADMIN — SODIUM BICARBONATE 50 MEQ: 84 INJECTION INTRAVENOUS at 12:20

## 2024-01-01 RX ADMIN — CALCIUM CHLORIDE 1000 MG: 100 INJECTION INTRAVENOUS; INTRAVENTRICULAR at 10:19

## 2024-01-01 RX ADMIN — SODIUM CHLORIDE 200 MCG: 9 INJECTION, SOLUTION INTRAVENOUS at 08:36

## 2024-01-01 RX ADMIN — SODIUM BICARBONATE 50 MEQ: 84 INJECTION INTRAVENOUS at 23:25

## 2024-01-01 RX ADMIN — ACETAMINOPHEN 975 MG: 325 TABLET ORAL at 20:52

## 2024-01-01 RX ADMIN — HYDROMORPHONE HYDROCHLORIDE 0.5 MG: 1 INJECTION, SOLUTION INTRAMUSCULAR; INTRAVENOUS; SUBCUTANEOUS at 18:30

## 2024-01-01 RX ADMIN — FAMOTIDINE 20 MG: 20 TABLET, FILM COATED ORAL at 20:52

## 2024-01-01 RX ADMIN — FENTANYL CITRATE 100 MCG: 0.05 INJECTION, SOLUTION INTRAMUSCULAR; INTRAVENOUS at 08:29

## 2024-01-01 RX ADMIN — PROPOFOL 10 MCG/KG/MIN: 10 INJECTION, EMULSION INTRAVENOUS at 17:05

## 2024-01-01 RX ADMIN — SODIUM CHLORIDE 100 MCG: 9 INJECTION, SOLUTION INTRAVENOUS at 09:19

## 2024-01-01 RX ADMIN — DEXMEDETOMIDINE HYDROCHLORIDE 0.4 MCG/KG/HR: 400 INJECTION, SOLUTION INTRAVENOUS at 15:03

## 2024-01-01 RX ADMIN — CALCIUM GLUCONATE 1000 MG: 20 INJECTION, SOLUTION INTRAVENOUS at 18:53

## 2024-01-01 RX ADMIN — ALBUMIN (HUMAN) 250 ML: 12.5 INJECTION, SOLUTION INTRAVENOUS at 00:18

## 2024-01-01 RX ADMIN — SODIUM CHLORIDE 100 MCG: 9 INJECTION, SOLUTION INTRAVENOUS at 09:22

## 2024-01-01 RX ADMIN — HYDROMORPHONE HYDROCHLORIDE 0.5 MG: 1 INJECTION, SOLUTION INTRAMUSCULAR; INTRAVENOUS; SUBCUTANEOUS at 19:04

## 2024-01-01 RX ADMIN — FENTANYL CITRATE 50 MCG: 0.05 INJECTION, SOLUTION INTRAMUSCULAR; INTRAVENOUS at 07:46

## 2024-01-01 RX ADMIN — SODIUM BICARBONATE 50 MEQ: 84 INJECTION INTRAVENOUS at 23:49

## 2024-01-01 RX ADMIN — WATER 2000 MG: 1 INJECTION INTRAMUSCULAR; INTRAVENOUS; SUBCUTANEOUS at 21:31

## 2024-01-01 RX ADMIN — SODIUM CHLORIDE, PRESERVATIVE FREE 10 ML: 5 INJECTION INTRAVENOUS at 09:07

## 2024-01-01 RX ADMIN — SODIUM BICARBONATE 50 MEQ: 84 INJECTION INTRAVENOUS at 01:25

## 2024-01-01 RX ADMIN — SODIUM CHLORIDE, PRESERVATIVE FREE 10 ML: 5 INJECTION INTRAVENOUS at 21:50

## 2024-01-01 RX ADMIN — METHYLENE BLUE 100 MG: 5 INJECTION INTRAVENOUS at 22:30

## 2024-01-01 RX ADMIN — Medication 100 MEQ: at 18:59

## 2024-01-01 RX ADMIN — VASOPRESSIN 0.04 UNITS/MIN: 20 INJECTION INTRAVENOUS at 12:36

## 2024-01-01 RX ADMIN — SODIUM BICARBONATE 50 MEQ: 84 INJECTION, SOLUTION INTRAVENOUS at 02:16

## 2024-01-01 RX ADMIN — SODIUM BICARBONATE 50 MEQ: 84 INJECTION INTRAVENOUS at 04:44

## 2024-01-01 RX ADMIN — EPINEPHRINE 5 MCG/MIN: 1 INJECTION INTRAMUSCULAR; INTRAVENOUS; SUBCUTANEOUS at 03:30

## 2024-01-01 ASSESSMENT — PAIN DESCRIPTION - LOCATION
LOCATION: BACK;CHEST
LOCATION: BACK;HIP
LOCATION: CHEST
LOCATION: BACK;HIP
LOCATION: CHEST

## 2024-01-01 ASSESSMENT — PULMONARY FUNCTION TESTS
PIF_VALUE: 18
PIF_VALUE: 27
PIF_VALUE: 22
PIF_VALUE: 24
PIF_VALUE: 22
PIF_VALUE: 22
PIF_VALUE: 25
PIF_VALUE: 27
PIF_VALUE: 22
PIF_VALUE: 20
PIF_VALUE: 17

## 2024-01-01 ASSESSMENT — PAIN SCALES - GENERAL
PAINLEVEL_OUTOF10: 0
PAINLEVEL_OUTOF10: 6
PAINLEVEL_OUTOF10: 0
PAINLEVEL_OUTOF10: 6
PAINLEVEL_OUTOF10: 6
PAINLEVEL_OUTOF10: 2
PAINLEVEL_OUTOF10: 0
PAINLEVEL_OUTOF10: 6
PAINLEVEL_OUTOF10: 10
PAINLEVEL_OUTOF10: 6
PAINLEVEL_OUTOF10: 6
PAINLEVEL_OUTOF10: 7
PAINLEVEL_OUTOF10: 0
PAINLEVEL_OUTOF10: 2

## 2024-01-01 ASSESSMENT — PAIN DESCRIPTION - DESCRIPTORS
DESCRIPTORS: ACHING;DISCOMFORT
DESCRIPTORS: ACHING

## 2024-01-01 ASSESSMENT — PAIN DESCRIPTION - PAIN TYPE: TYPE: CHRONIC PAIN

## 2024-01-01 ASSESSMENT — PAIN DESCRIPTION - ORIENTATION
ORIENTATION: RIGHT;LEFT;LOWER
ORIENTATION: MID
ORIENTATION: MID
ORIENTATION: LOWER

## 2024-01-01 ASSESSMENT — PAIN DESCRIPTION - FREQUENCY: FREQUENCY: OTHER (COMMENT)

## 2024-01-01 ASSESSMENT — PAIN - FUNCTIONAL ASSESSMENT
PAIN_FUNCTIONAL_ASSESSMENT: PREVENTS OR INTERFERES SOME ACTIVE ACTIVITIES AND ADLS
PAIN_FUNCTIONAL_ASSESSMENT: 0-10

## 2024-01-01 ASSESSMENT — PAIN DESCRIPTION - ONSET: ONSET: GRADUAL

## 2024-01-10 PROBLEM — G47.33 OBSTRUCTIVE SLEEP APNEA ON CPAP: Status: ACTIVE | Noted: 2024-01-01

## 2024-01-10 PROBLEM — E11.9 TYPE 2 DIABETES MELLITUS, WITHOUT LONG-TERM CURRENT USE OF INSULIN (HCC): Status: ACTIVE | Noted: 2024-01-01

## 2024-01-10 PROBLEM — R07.9 CHEST PAIN: Status: ACTIVE | Noted: 2024-01-01

## 2024-01-10 PROBLEM — I21.4 NSTEMI (NON-ST ELEVATED MYOCARDIAL INFARCTION) (HCC): Status: ACTIVE | Noted: 2024-01-01

## 2024-01-10 PROBLEM — E78.5 HYPERLIPIDEMIA: Status: ACTIVE | Noted: 2024-01-01

## 2024-01-10 PROBLEM — I10 HTN (HYPERTENSION): Status: ACTIVE | Noted: 2024-01-01

## 2024-01-10 PROBLEM — I48.0 PAF (PAROXYSMAL ATRIAL FIBRILLATION) (HCC): Status: ACTIVE | Noted: 2024-01-01

## 2024-01-10 PROBLEM — I25.10 CAD (CORONARY ARTERY DISEASE): Status: ACTIVE | Noted: 2024-01-01

## 2024-01-10 NOTE — H&P (VIEW-ONLY)
[facility] Consult Note      Patient: Sahara Neff MRN: [id number]  SSN: [id number]    YOB: 1950  Age: 73 y.o.  Sex: female    Date of Consultation: 1/10/2024    Reason for Consultation: Chest pain    HPI:  I was asked by Dr. Torres to see this pleasant patient for chest pain.  Sahara Neff is a 73-year-old pleasant female with a history of CAD and history of PCI to the RCA and LAD 11 years ago in [place] and also have history of atrial fibrillation status post A-fib ablation 7 years ago in [place].  Patient.  Patient came to the hospital with acute onset of back pain and chest pain.  Patient have these kind of symptoms on and off in the past and has seen her cardiologist underwent stress testing that was negative.  This time patient was in the grocery store the pain was stronger and longer in duration without resolution brought her to the hospital.  EKG done has shown diffuse ST and T changes which are somewhat newer.  First troponin is negative chest x-ray is normal  Patient have history of PCI to right radial artery in the past with subsequent mildly weak pulse according to the patient  Patient also have sleep apnea  Patient opted for aspirin rather than therapeutic anticoagulation after A-fib ablation.             No past medical history on file.  No past surgical history on file.  Current Facility-Administered Medications   Medication Dose Route Frequency    heparin (porcine) injection 4,000 Units  4,000 Units IntraVENous PRN    heparin (porcine) injection 2,000 Units  2,000 Units IntraVENous PRN    heparin 25,000 units in dextrose 5% 250 mL (premix) infusion  5-30 Units/kg/hr IntraVENous Continuous     No current outpatient medications on file.       Allergies and Intolerances:   No Known Allergies    Family History:   No family history on file.    Social History:   She  has no history on file for tobacco use.  She  has no history on file for alcohol use.          Review of  \"TRIGL\"  No components found for: \"GPT\"  No results found for: \"LABA1C\"    RADIOLOGY:  [unfilled]  [unfilled]      Cardiology Procedures: [unfilled] [unfilled] Cardiolite (Tc-99m Sestamibi) stress test        Impression / Plan:    There is no problem list on file for this patient.    A/P    ACS/non-STEMI  CAD with history of PCI to the RCA and LAD  Paroxysmal atrial fibrillation status post atrial fibrillation ablation procedure and not on therapeutic anticoagulation  Chronic dyspnea on exertion  Obesity  Sleep apnea on CPAP    Plan:  D-dimer is negative  Clinical presentation is consistent with ACS/non-STEMI/unstable angina first troponin is negative second troponin is slightly elevated  Patient symptoms have improved with minimal residual symptoms  Will get echocardiogram  Start aspirin  Start heparin as per ACS protocol  Case discussed in detail with Dr. Torres  Discussed with patient about management plan.  Plan is cardiac catheterization possible PCI depending upon on clinical course today/tomorrow.  Thank you for allowing me to participate in the management of this pleasant patient.  Please feel free to contact me if you have any questions or concerns.          Signed By: KEON HANNA MD     January 10, 2024

## 2024-01-10 NOTE — ED PROVIDER NOTES
immediate cardiology consult.  Discussed with the cardiologist on-call who did not feel this to be significant enough to warrant emergent cath lab activation.  However, cardiology did not feel that this patient had a very suspicious presentation for unstable angina and agreed with starting heparin immediately.  They also recommended D-dimer which was drawn and was low.  Patient's initial troponin was in the normal range.  Repeat EKG showed persistent but slightly improving ST depressions with no new ST elevations.  Her repeat troponin was markedly elevated.  Discussed with hospitalist for telemetry admission.  Heparin was started in the ED.  Upon completion of second troponin and D-dimer, cardiologist contacted me again and agree that this did not appear to be related to PE but rather more likely a cardiac component.  Plan is for echocardiogram and possible cath during this hospitalization.  The decision to perform testing and results were discussed with the patient.  I discussed each of these tests and considerations with the patient. They agree with the plan of admission.      ADDITIONAL CONSIDERATIONS:  The following Chronic Conditions impacted the patient's care: CAD because the patient is likely experiencing unstable angina and a non-ST elevation MI.    Critical Care Time:     I have spent 103 minutes of critical care time involved in lab review, consultations with specialist, family decision-making, and documentation. This time does not include time spent on separately billable procedures.  During this entire length of time, I was immediately available to the patient.    Critical Care:  The reason for providing this level of medical care for this critically ill patient was due a critical illness that impaired one or more vital organ systems such that there was a high probability of imminent or life-threatening deterioration in the patient's condition. This care involved high complexity decision making to  escaped final proofreading.    Mendel Torres MD  (Electronically signed)            Mendel Torres MD  01/10/24 1917

## 2024-01-10 NOTE — CONSULTS
[facility] Consult Note      Patient: Sahara Neff MRN: [id number]  SSN: [id number]    YOB: 1950  Age: 73 y.o.  Sex: female    Date of Consultation: 1/10/2024    Reason for Consultation: Chest pain    HPI:  I was asked by Dr. Torres to see this pleasant patient for chest pain.  Sahara Neff is a 73-year-old pleasant female with a history of CAD and history of PCI to the RCA and LAD 11 years ago in [place] and also have history of atrial fibrillation status post A-fib ablation 7 years ago in [place].  Patient.  Patient came to the hospital with acute onset of back pain and chest pain.  Patient have these kind of symptoms on and off in the past and has seen her cardiologist underwent stress testing that was negative.  This time patient was in the grocery store the pain was stronger and longer in duration without resolution brought her to the hospital.  EKG done has shown diffuse ST and T changes which are somewhat newer.  First troponin is negative chest x-ray is normal  Patient have history of PCI to right radial artery in the past with subsequent mildly weak pulse according to the patient  Patient also have sleep apnea  Patient opted for aspirin rather than therapeutic anticoagulation after A-fib ablation.             No past medical history on file.  No past surgical history on file.  Current Facility-Administered Medications   Medication Dose Route Frequency    heparin (porcine) injection 4,000 Units  4,000 Units IntraVENous PRN    heparin (porcine) injection 2,000 Units  2,000 Units IntraVENous PRN    heparin 25,000 units in dextrose 5% 250 mL (premix) infusion  5-30 Units/kg/hr IntraVENous Continuous     No current outpatient medications on file.       Allergies and Intolerances:   No Known Allergies    Family History:   No family history on file.    Social History:   She  has no history on file for tobacco use.  She  has no history on file for alcohol use.          Review of

## 2024-01-10 NOTE — ED TRIAGE NOTES
Pt ambulatory to triage c/o CP and back pain x 20 mins. Hx stents, DM. Takes  at night. Pt is diaphoretic in triage.

## 2024-01-10 NOTE — H&P
History & Physical    Patient: Sahara Neff MRN: [id number]  [facility]: [id number]    YOB: 1950  Age: 73 y.o.  Sex: female      DOA: 1/10/2024  Primary Care Provider:  Arianna Chilel MD      Assessment/Plan     Active Hospital Problems    Diagnosis Date Noted    Chest pain [R07.9] 01/10/2024    HTN (hypertension) [I10] 01/10/2024    Hyperlipidemia [E78.5] 01/10/2024    Type 2 diabetes mellitus, without long-term current use of insulin (HCC) [E11.9] 01/10/2024    Obstructive sleep apnea on CPAP [G47.33] 01/10/2024    BMI 39.0-39.9,adult [Z68.39] 01/10/2024    CAD (coronary artery disease) [I25.10] 01/10/2024       Admit to telemetry    Chest pain, history of CAD  Elevated troponin, will see the trend, morphine nitro as needed for chest pain  CT chest no acute process  EKG diffuse ST and T changes  Bedrest  Echo  On heparin drip  Cardiology consulted  Continue aspirin and Lipitor check a lipid profile  UDS, NC oxygen, cardiac monitor      DM type II , with complication,-CAD  -Hold p.o. medication ,ssi, diabetic diet , hypoglycemia protocol       HTN, accelerated  Norvasc    Sleep apnea on CPAP  We will order    BMI 39-lifestyle modification    Full code  Please note that this dictation was completed with [employer], the [employer] voice recognition software.  Quite often unanticipated grammatical, syntax, homophones, and other interpretive errors are inadvertently transcribed by the computer software.  Please disregard these errors.  Please excuse any errors that have escaped final proofreading    Estimate  length of stay : 2-3 day    DVT : heparin drip ppi proph  CC: Upper back pain       HPI:     Sahara Neff is a 73 y.o. female with history of sleep apnea on CPAP, hypertension, hyperlipidemia, CAD, BMI 39 presented to ER due to on and off upper back pain, and the rib pain for several weeks, worsening on exertion.  Her pain become worsening today and sustained, associated with shortness of breath.  Denies

## 2024-01-11 PROBLEM — I25.10 DISEASE OF CARDIOVASCULAR SYSTEM: Status: ACTIVE | Noted: 2024-01-01

## 2024-01-11 NOTE — PROGRESS NOTES
Cardiology Progress Note        Patient: Sahara Neff        Sex: female          DOA: 1/10/2024  YOB: 1950      Age:  73 y.o.        LOS:  LOS: 1 day   Assessment/Plan     Principal Problem:    Chest pain  Active Problems:    NSTEMI (non-ST elevated myocardial infarction) ([facility])    PAF (paroxysmal atrial fibrillation) ([facility])    HTN (hypertension)    Hyperlipidemia    Type 2 diabetes mellitus, without long-term current use of insulin ([facility])    Obstructive sleep apnea on CPAP    BMI 39.0-39.9,adult    CAD (coronary artery disease)  Resolved Problems:    * No resolved hospital problems. *      Plan:  Non-STEMI   Plan is to do left heart catheterization possible PCI  Risk benefits and alternatives were discussed in detail with the patient.  All kind of common and uncommon complication were discussed patient agreed to proceed  Rest of the recommendation after cardiac catheterization                    Subjective:    cc:  Chest pain           REVIEW OF SYSTEMS:    Chest pain has resolved.  General: No fevers or chills.  Cardiovascular: No chest pain or pressure. No palpitations. No ankle swelling  Pulmonary: No SOB, orthopnea, PND  Gastrointestinal: No nausea, vomiting or diarrhea      Objective:      Visit Vitals  BP (!) 144/52   Pulse 63   Temp 98.4 °F (36.9 °C) (Oral)   Resp 14   Ht 1.702 m (5' 7\")   Wt 113.4 kg (250 lb)   SpO2 98%   Breastfeeding No   BMI 39.16 kg/m²     Body mass index is 39.16 kg/m².    Physical Exam:  General Appearance: Comfortable, not using accessory muscles of respiration.  NECK: No JVD, no thyroidomeglay.   LUNGS:  bilateral air entry positive   HEART: S1+S2 audible,    ABD: Non-tender, BS Audible    EXT: No edema, and no cysnosis.  VASCULAR EXAM: Pulses are intact.    PSYCHIATRIC EXAM: Mood is appropriate.    Medication:  Current Facility-Administered Medications   Medication Dose Route Frequency    heparin (porcine) injection 4,000 Units  4,000 Units IntraVENous PRN

## 2024-01-11 NOTE — DISCHARGE SUMMARY
Discharge Summary    Patient: Sahara Neff MRN: [id number]  [facility]: [id number]    YOB: 1950  Age: 73 y.o.  Sex: female    DOA: 1/10/2024 LOS:  LOS: 1 day   Discharge Date:      Primary Care Provider:  Arianna Chilel MD    Admission Diagnoses: Unstable angina (HCC) [I20.0]  Chest pain [R07.9]  Non-STEMI (non-ST elevated myocardial infarction) (HCC) [I21.4]  Chest pain due to myocardial ischemia, unspecified ischemic chest pain type [I25.9]    Discharge Diagnoses:    Active Hospital Problems    Diagnosis Date Noted    Disease of cardiovascular system [I25.10] 01/11/2024    Chest pain [R07.9] 01/10/2024    HTN (hypertension) [I10] 01/10/2024    Hyperlipidemia [E78.5] 01/10/2024    Type 2 diabetes mellitus, without long-term current use of insulin (HCC) [E11.9] 01/10/2024    Obstructive sleep apnea on CPAP [G47.33] 01/10/2024    BMI 39.0-39.9,adult [Z68.39] 01/10/2024    CAD (coronary artery disease) [I25.10] 01/10/2024    NSTEMI (non-ST elevated myocardial infarction) ([facility]) [I21.4] 01/10/2024    PAF (paroxysmal atrial fibrillation) ([facility]) [I48.0] 01/10/2024       Discharge Condition: stable     Discharge Medications:        Medication List        START taking these medications      aspirin 81 MG EC tablet  Take 1 tablet by mouth daily  Start taking on: January 12, 2024     heparin (porcine) 100 UNIT/ML Soln infusion  Infuse 567-3,402 Units/hr intravenously continuous     nitroGLYCERIN 0.4 MG SL tablet  Commonly known as: NITROSTAT  Place 1 tablet under the tongue every 5 minutes as needed for Chest pain up to max of 3 total doses. If no relief after 1 dose, call 911.     pantoprazole 40 MG tablet  Commonly known as: PROTONIX  Take 1 tablet by mouth every morning (before breakfast)  Start taking on: January 12, 2024            CHANGE how you take these medications      amLODIPine 5 MG tablet  Commonly known as: NORVASC  Take 1 tablet by mouth daily  Start taking on: January 12, 2024  What changed:  Called pt regarding his appointment today as he didn't show up. He reports he forgot about it and apologized. He reports swallowing is going ok. He hasn't been doing his exercises. Encouraged him to complete exercises as prescribed and continue po intake. He agrees. SLP to follow up in conjunction with ENT clinic visit after completion of radiation therapy.   Thank you for the referral of Jareth Gallardo. If you have any questions about this report, please contact me using the information below.      Trinity Winters MS, CCC-SLP  Speech-Language Pathology  [facility]  Department of Otolaryngology/D&T - 4th floor  Pager: [phone]  Phone: [phone]  Email: [email or web address]

## 2024-01-11 NOTE — ED NOTES
Attempted to get repeat troponin at this time. Ultrasound performing echo at bedside, admission MD at bedside at this time. Will try to obtain when finished   
Attempted to order food tray for patient, food services not answering at this time  
Bedside commode set up for patient use.   
Food tray ordered   
PT/PTT/INR hemolyzed in lab, reported to RN. Heparin bolus dose given prior to knowledge of hemolyzed lab. Labs re-drawn approximately 4 minutes after heparin bolus administered.    
Report from MARGARITO Vides. Resuming care for patient at this time.  
Report to MARGARITO Sands.  
show

## 2024-01-11 NOTE — PROGRESS NOTES
Discussed with the [facility] access /transfer center.   I have initiated the transfer of patient for urgent bypass due to left main disease and patient is on intra-aortic balloon pump.  Thanks

## 2024-01-11 NOTE — H&P
CRITICAL CARE ADMISSION NOTE      Name: Sahara Neff   : 1950   MRN: [id number]   Date: 2024      Reason for ICU Admission: NSTEMI     ICU PROBLEM LIST   NSTEMI  CAD s/p distant PCI to RCA and new critical L main disease  ANDREW on CPAP  HTN  Afib s/p ablation  HLD  Obesity    HISTORY OF PRESENT ILLNESS:   Pt is a 73 y.o F w/ PMH as noted above who presents to CCU from [facility] for CTS evaluation and CABG. Pt presented to OSH 2/2 progressive dyspnea and CP w/ associated back pain. Pt diagnosed w/ NSTEMI, started on heparin gtt and underwent LHC w/ critical L main disease and PDA. IABP placed by OSH cardiology given severity of coronary disease for coronary perfusion. States HERRING past several months w/ negative stress test. [facility] CTS consulted via transfer center and pt transferred to [facility].    24 HOUR EVENTS:   Arrives to CCU, NAD, VSS. IABP in place at 1:1    NEUROLOGICAL:    Mentation intact  PRN pain regimen  Delirium precautions    PULMONOLOGY:   O2 per NC for spO2 92-98%    CARDIOVASCULAR:   CTS consulted, plan for CABG   IABP in place, CXR for surveillance  Heparin gtt, titrate per protocol  Statin  Hold AC or antiplatelets pre-op  PRN NTG for CP  Bed rest, neurovascular checks    GASTROINTESTINAL:   NPO midnight     RENAL/ELECTROLYTE/FLUIDS:   Strict I/Os  Mg/Phos/K >2/3/4    ENDOCRINE:   Goal euglycemia    HEMATOLOGY/ONCOLOGY:   Heparin gtt    ID/MICRO:   No acute issues    ICU DAILY CHECKLIST     Code Status:Full  DVT Prophylaxis:Heparin gtt  T/L/D: PIV, A line, IABP  SUP: NA  Diet: Regular, NPO midnight  Activity Level:bed rest  ABCDEF Bundle/Checklist Completed:Yes  Disposition: Stay in ICU  Multidisciplinary Rounds Completed:  Pending  Patient/Family Updated: Yes      Peripheral IV 01/10/24 Right Forearm (Active)   Site Assessment Clean, dry & intact 24 07   Line Status Infusing 24 07   Line Care Connections checked and tightened 24 07   Phlebitis Assessment No

## 2024-01-11 NOTE — PROGRESS NOTES
TRANSFER - OUT REPORT:    Verbal report given to Arlene PIMENTEL on Sahara Neff  being transferred to Care Unit for ordered procedure       Report consisted of patient's Situation, Background, Assessment and   Recommendations(SBAR).     Information from the following report(s) Nurse Handoff Report and MAR was reviewed with the receiving nurse.           Lines:   Peripheral IV 01/10/24 Right Forearm (Active)   Site Assessment Clean, dry & intact 01/11/24 0406   Line Status Infusing 01/11/24 0406   Line Care Connections checked and tightened 01/11/24 0406   Phlebitis Assessment No symptoms 01/11/24 0406   Infiltration Assessment 0 01/11/24 0406   Alcohol Cap Used Yes 01/09/24 2000   Dressing Status Clean, dry & intact 01/11/24 0406   Dressing Type Transparent 01/11/24 0406       Peripheral IV 01/10/24 Left;Dorsal Forearm (Active)   Site Assessment Clean, dry & intact 01/11/24 0406   Line Status Capped 01/11/24 0406   Line Care Connections checked and tightened 01/09/24 2000   Phlebitis Assessment No symptoms 01/11/24 0406   Infiltration Assessment 0 01/11/24 0406   Alcohol Cap Used Yes 01/11/24 0406   Dressing Status Clean, dry & intact 01/11/24 0406   Dressing Type Transparent 01/11/24 0406        Opportunity for questions and clarification was provided.      Patient transported with:  Monitor

## 2024-01-11 NOTE — PLAN OF CARE
Problem: Discharge Planning  Goal: Discharge to home or other facility with appropriate resources  Outcome: Progressing  Flowsheets (Taken 1/10/2024 2000)  Discharge to home or other facility with appropriate resources:   Identify barriers to discharge with patient and caregiver   Arrange for needed discharge resources and transportation as appropriate   Identify discharge learning needs (meds, wound care, etc)     Problem: Pain  Goal: Verbalizes/displays adequate comfort level or baseline comfort level  Outcome: Progressing  Flowsheets (Taken 1/10/2024 2000)  Verbalizes/displays adequate comfort level or baseline comfort level:   Assess pain using appropriate pain scale   Encourage patient to monitor pain and request assistance   Administer analgesics based on type and severity of pain and evaluate response   Implement non-pharmacological measures as appropriate and evaluate response     Problem: ABCDS Injury Assessment  Goal: Absence of physical injury  Outcome: Progressing  Flowsheets (Taken 1/10/2024 2000)  Absence of Physical Injury: Implement safety measures based on patient assessment

## 2024-01-11 NOTE — PROGRESS NOTES
Pankaj paperwork completed,  pt to [facility] per rescue with Cecilia Roberts and balloon pump. Condition unchanged at time of discharge

## 2024-01-11 NOTE — INTERVAL H&P NOTE
Update History & Physical    The patient's History and Physical of January 11, 2024 was reviewed with the patient and I examined the patient. There was no change. The surgical site was confirmed by the patient and me.     Plan: The risks, benefits, expected outcome, and alternative to the recommended procedure have been discussed with the patient. Patient understands and wants to proceed with the procedure.     Electronically signed by KEON HANNA MD on 1/11/2024 at 9:10 AM

## 2024-01-11 NOTE — PROGRESS NOTES
Sister to bedside to pray prior to going to cath lab, heparin gtt stopped and pt up to bathroom to void prior to going to Cath lab

## 2024-01-11 NOTE — PROGRESS NOTES
Talked with transfer center, accepting physician will be Dr.Anibal Greenfield -will go to cardiac icu

## 2024-01-11 NOTE — PROGRESS NOTES
conducted an initial consultation and spiritual assessment for Sahara Neff, who is a 73 y.o.,female.     Initiated a relationship of care and support.   Explored issues of juan m, belief, spirituality and Islam/ritual needs.  Provided information about Spiritual Care Services.  Patient processed feelings about current hospitalization.  Offered prayer and assurance of continued prayers on patients behalf.   Chart reviewed. Patient going for a procedure.  Patient expressed gratitude for Spiritual Care visit.    Chaplains will continue to follow and will provide pastoral care as needed or requested.    recommends bedside caregivers page  on duty if patient shows signs of acute spiritual or emotional distress.    Sister Tiara Juarez MA, [facility]     Spiritual Care  [phone]

## 2024-01-11 NOTE — PROGRESS NOTES
Returned to care unit post cath and balloon pump insertion,  awaiting transport to [facility] for continued care, pt complains of lower back pain 5/10 states chronic pain,  small rolled towel under left hip for comfort.  Pt inst to keep right leg straight. Right pedal and post tibial pulse remain strong with doppler. Balloon pump dressing clean dry and intact.

## 2024-01-11 NOTE — BRIEF OP NOTE
Brief Postoperative Note      Patient: Sahara Neff  YOB: 1950  MRN: [id number]    Date of Procedure: 1/11/2024    Pre-Op Diagnosis Codes:     * NSTEMI (non-ST elevated myocardial infarction) ([facility]) [I21.4]     * PAF (paroxysmal atrial fibrillation) ([facility]) [I48.0]    Post-Op Diagnosis: Same       Procedure(s):  Left heart cath / coronary angiography  Intra-aortic balloon pump insertion    Surgeon(s):  Roberto Oseguera MD    Assistant:  * No surgical staff found *    Anesthesia: Other    Estimated Blood Loss (mL): less than 50     Complications: None    Specimens:   * No specimens in log *    Implants:  * No implants in log *      Drains: * No LDAs found *    Findings: Critical left main distal stenosis 80% to 90 stenosis   Proximal PDA have 80% disease  Mild to moderate disease elsewhere  Intra-aortic balloon pump placed due to critical disease   Patient will need CABG   Discussed with patient will transfer to tertiary care hospital for urgent CABG.  Discussed with patient.      Electronically signed by ROBERTO OSEGUERA MD on 1/11/2024 at 10:48 AM

## 2024-01-12 PROBLEM — Z95.1 S/P CABG X 3: Status: ACTIVE | Noted: 2024-01-01

## 2024-01-12 NOTE — ANESTHESIA PROCEDURE NOTES
Procedure Performed: SHAN       Start Time:        End Time:      Preanesthesia Checklist:  Patient identified, IV assessed, risks and benefits discussed, monitors and equipment assessed, procedure being performed at surgeon's request and anesthesia consent obtained.    General Procedure Information  Diagnostic Indications for Echo:  assessment of ascending aorta, assessment of surgical repair, hemodynamic monitoring and assessment of valve function  Location performed:  OR  Intubated  Bite block not placed  Heart visualized  Probe Insertion:  Easy  Probe Type:  Mulitplane, 3D and epiaortic  Modalities:  2D, color flow mapping, 3D, continuous wave Doppler and pulse wave Doppler    Echocardiographic and Doppler Measurements    Ventricles    Right Ventricle:  Hypertrophy not present.  Thrombus not present.  Global function normal.    Left Ventricle:  Hypertrophy present.  Thrombus not present.  Global Function normal.      Ventricular Regional Function:  1- Basal Anteroseptal:  normal  2- Basal Anterior:  normal  3- Basal Anterolateral:  normal  4- Basal Inferolateral:  normal  5- Basal Inferior:  normal  6- Basal Inferoseptal:  normal  7- Mid Anteroseptal:  normal  8- Mid Anterior:  normal  9- Mid Anterolateral:  normal  10- Mid Inferolateral:  normal  11- Mid Inferior:  normal  12- Mid Inferoseptal:  normal  13- Apical Anterior:  normal  14- Apical Lateral:  normal  15- Apical Inferior:  normal  16- Apical Septal:  normal  17- [place]:  normal      Valves    Aortic Valve:  Annulus normal.  Stenosis not present.  Regurgitation none.  Leaflets normal.  Leaflet motions normal.      Mitral Valve:  Annulus normal.  Stenosis not present.  Leaflets normal.  Leaflet motions normal.      Tricuspid Valve:  Annulus normal.  Stenosis not present.  Leaflet motions normal.    Pulmonic Valve:  Annulus normal.  Stenosis not present.  Regurgitation none.    Other Valve Findings:       Trace TR and MR    Aorta    Ascending Aorta:  Size

## 2024-01-12 NOTE — BRIEF OP NOTE
BRIEF OP NOTE  Pre-Op Diagnosis: Disease of cardiovascular system [I25.10]    Post-Op Diagnosis: Same      Procedure: Procedure(s) with comments:  CORONARY ARTERY BYPASS GRAFTING (CABG) X 3; LIMA TO LAD; SVH OF LLE; LIMA- LAD, SVG-OM1 and PDA. ECC; SHAN & EPIAORTIC US BY DR. MONROE - PATIENT HAS A BALLOON PUMP.     Surgeon: Jackson Adam MD    Assistant(s): Evelyn Dodge PA-C    Anesthesia: General     Infusions: Precedex, insulin    Estimated Blood Loss: 300cc    Cell Saver: 1190cc    Cross clamp: 62 minutes    Bypass 75 minutes    Specimens: None    Drains and pacing wires: 2 atrial wires, 1 bipolar ventricular wire, 3 jodie drains    Complications: None    Findings: See full operative note.    Implants: None

## 2024-01-12 NOTE — PROGRESS NOTES
Angiographic finding discussed with patient.   Discussed with patient she will be transferred to tertiary care hospital for urgent CABG.  Patient preferred to go to [facility].  Patient is okay to be transferred to [facility] or [facility] depending upon bed availability.  I have discussed with Dr. Adam cardiothoracic surgeon and intensivist Dr. Greenfield, both have kindly accepted the patient.  I have updated the patient with the acceptance in [facility]  And patient agreed with the plan.  Thx

## 2024-01-12 NOTE — ANESTHESIA POSTPROCEDURE EVALUATION
Department of Anesthesiology  Postprocedure Note    Patient: Sahara Neff  MRN: [id number]  YOB: 1950  Date of evaluation: 1/12/2024    Procedure Summary     Date: 01/12/24 Room / Location: [facility] MAIN OR [address] [place] [address] MAIN OR    Anesthesia Start: 0732 Anesthesia Stop: 1229    Procedure: CORONARY ARTERY BYPASS GRAFTING (CABG) X 3; LIMA TO LAD; SVH OF LLE; ECC; SHAN & EPIAORTIC US BY DR. MONROE (Chest) Diagnosis:       Disease of cardiovascular system      (Disease of cardiovascular system [I25.10])    Providers: Jackson Adam MD Responsible Provider: Ovidio Boland MD    Anesthesia Type: General ASA Status: 5 - Emergent          Anesthesia Type: General    Ida Phase I:      Ida Phase II:      Anesthesia Post Evaluation    Patient location during evaluation: ICU  Patient participation: complete - patient cannot participate  Level of consciousness: sedated and ventilated  Pain score: 0  Airway patency: patent  Nausea & Vomiting: no nausea and no vomiting  Cardiovascular status: hemodynamically stable  Respiratory status: acceptable  Hydration status: euvolemic  Pain management: adequate        No notable events documented.

## 2024-01-12 NOTE — ANESTHESIA PROCEDURE NOTES
Central Venous Line:    A central venous line was placed using ultrasound guidance and surface landmarks, in the ICU for the following indication(s): central venous access and CVP monitoring.    Sterility preparation included the following: hand hygiene performed prior to procedure, maximum sterile barriers used and sterile technique used to drape from head to toe.    The patient was placed in Trendelenburg position.The right internal jugular vein was prepped.    The site was prepped with Chloraprep.  A 9 Fr (size), 12 (length), introducer double lumen was placed.    During the procedure, the following specific steps were taken: target vein identified, needle advanced into vein and blood aspirated and guidewire advanced into vein.    Intravenous verification was obtained by ultrasound, venous blood return and manometry.    Post insertion care included: all ports aspirated, all ports flushed easily, guidewire removed intact, Biopatch applied, line sutured in place and dressing applied.    During the procedure the patient experienced: patient tolerated procedure well with no complications.      Outcomes: uncomplicated  Real-time US image taken/store: yes  Anesthesia type: local..No    Additional notes:  SLIC placed  Staffing  Performed: Anesthesiologist   Performed by: Jose Atkinson DO  Authorized by: Jose Atkinson DO    Preanesthetic Checklist  Completed: patient identified, IV checked, site marked, risks and benefits discussed, surgical/procedural consents, equipment checked, pre-op evaluation, timeout performed, anesthesia consent given, oxygen available, monitors applied/VS acknowledged and blood product R/B/A discussed and consented

## 2024-01-12 NOTE — ANESTHESIA PRE PROCEDURE
Department of Anesthesiology  Preprocedure Note       Name:  Sahara Neff   Age:  73 y.o.  :  1950                                          MRN:  [id number]         Date:  2024      Surgeon: Surgeon(s):  Jackson Adam MD    Procedure: Procedure(s):  ON PUMP CABG WITH ENDOSCOPIC VEIN HARVEST NO PAC    Medications prior to admission:   Prior to Admission medications    Medication Sig Start Date End Date Taking? Authorizing Provider   aspirin 81 MG EC tablet Take 1 tablet by mouth daily 24   Mandy Patterson MD   atorvastatin (LIPITOR) 40 MG tablet Take 1 tablet by mouth nightly 24   Mandy Patterson MD   amLODIPine (NORVASC) 5 MG tablet Take 1 tablet by mouth daily 24   Mandy Patterson MD   pantoprazole (PROTONIX) 40 MG tablet Take 1 tablet by mouth every morning (before breakfast) 24   Mandy Patterson MD   nitroGLYCERIN (NITROSTAT) 0.4 MG SL tablet Place 1 tablet under the tongue every 5 minutes as needed for Chest pain up to max of 3 total doses. If no relief after 1 dose, call 911. 24   Mandy Patterson MD   Heparin Sod, Porcine, in D5W (HEPARIN, PORCINE,) 100 UNIT/ML SOLN infusion Infuse 567-3,402 Units/hr intravenously continuous 24   Mandy Patterson MD       Current medications:    Current Facility-Administered Medications   Medication Dose Route Frequency Provider Last Rate Last Admin   • chlorhexidine (PERIDEX) 0.12 % solution 15 mL  15 mL Mouth/Throat BID Tessie Conteh APRN - NP       • mupirocin (BACTROBAN) 2 % ointment   Each Nostril BID Tessie Conteh APRN - NP   Given at 24   • atorvastatin (LIPITOR) tablet 40 mg  40 mg Oral Nightly Aldair Greenfield MD   40 mg at 24   • pantoprazole (PROTONIX) tablet 40 mg  40 mg Oral QAM AC Aldair Greenfield MD       • sodium chloride flush 0.9 % injection 5-40 mL  5-40 mL IntraVENous 2 times per day Aldair Greenfield MD       • sodium chloride flush 0.9 % injection 5-40 mL  5-40 mL IntraVENous PRN Aldair Greenfield, MD       •

## 2024-01-12 NOTE — ANESTHESIA PROCEDURE NOTES
Arterial Line:    An arterial line was placed using ultrasound guidance and surface landmarks, in the ICU for the following indication(s): continuous blood pressure monitoring and blood sampling needed.    A 20 gauge (size), 1 and 3/4 inch (length), Arrow (type) catheter was placed, Seldinger technique used, into the left radial artery, secured by suture, tape and Tegaderm.  Anesthesia type: Local  Local infiltration: Injection    Events:  patient tolerated procedure well with no complications.    Additional notes:  Collateral perfusion verified   Performed: Anesthesiologist   Preanesthetic Checklist  Completed: patient identified, IV checked, risks and benefits discussed, surgical/procedural consents, equipment checked, pre-op evaluation, timeout performed, anesthesia consent given, oxygen available and monitors applied/VS acknowledged

## 2024-01-12 NOTE — POST SEDATION
Sedation Post Procedure Note    Patient Name: Sahara Neff   YOB: 1950  Room/Bed: [facility]/  Medical Record Number: [id number]  Date: 1/11/2024   Time: 8:24 PM         Physicians/Assistants: KEON HANNA MD, MD    Procedure Performed:  Left heart catheterization, coronary angiogram   Placement of intra-aortic balloon pump in the setting of non-STEMI and critical distal left main 80% to 90% stenosis.    Post-Sedation Vital Signs:  Vitals:    01/11/24 1315   BP: (!) 155/72   Pulse: 55   Resp: 20   Temp: 97.9 °F (36.6 °C)   SpO2: 97%      Vital signs were reviewed and were stable after the procedure (see flow sheet for vitals)            Post-Sedation Exam: Lungs: clear to auscultation bilaterally without crackles or wheezing and Cardiovascular: regular rate and rhythm, no murmurs rubs or gallops           Complications: none    Electronically signed by KEON HANNA MD on 1/11/2024 at 8:24 PM

## 2024-01-13 NOTE — CARE COORDINATION
Transition of Care Plan   RUR 17%    1/11/24  Transfer from [facility] for urgent bypass due to left main diease and patient on intra-aortic balloon pump    Admission   NSTEMI   Hx of CAD/ hypertension/ sleep apnea on CPAP    CABG 1/12/24      CM attempted to meet with patient but she remains sedated and vented.. Her family was not  in CVICU or in the waiting area.   CM will attempt tomorrow to meet with patient/family.  Will provide Bundle Letter and at that time.     Contact  Daughter Daniel Mena   [phone]    FRANCES COUCH, BSW 0.

## 2024-01-13 NOTE — ANESTHESIA POSTPROCEDURE EVALUATION
Department of Anesthesiology  Postprocedure Note    Patient: Sahara Neff  MRN: [id number]  YOB: 1950  Date of evaluation: 1/13/2024    Procedure Summary       Date: 01/12/24 Room / Location: [facility] [place] OR [address] [place] [address] MAIN OR    Anesthesia Start: 2343 Anesthesia Stop: 01/13/24 0318    Procedure: COVERED STENT PLACEMENT OF RIGHT EXTERNAL ILIAC ARTERY EXPLORATION OF LEFT GROIN FOR CONTROL OF BLEEDING (Groin) Diagnosis:       Groin hematoma, subsequent encounter      (Groin hematoma, subsequent encounter [S30.1XXD])    Providers: Sujit Rockwell MD Responsible Provider: Nicolas Stout DO    Anesthesia Type: General ASA Status: 4 - Emergent            Anesthesia Type: General    Ida Phase I:      Ida Phase II:      Anesthesia Post Evaluation    Patient location during evaluation: ICU  Patient participation: complete - patient cannot participate  Level of consciousness: sedated and ventilated and obtunded/minimal responses  Pain score: 0  Airway patency: patent  Nausea & Vomiting: no nausea  Cardiovascular status: hemodynamically stable, vasoactive/inotropes and hypotensive  Respiratory status: acceptable, intubated and ventilator  Hydration status: euvolemic  Comments: BP: 149/70 Pulse: 109  Resp: 14 SpO2: 99  Temp: 96.4 °F (35.8 °C)      Pain management: adequate    No notable events documented.

## 2024-01-13 NOTE — ANESTHESIA PRE PROCEDURE
Department of Anesthesiology  Preprocedure Note       Name:  Sahara Neff   Age:  73 y.o.  :  1950                                          MRN:  [id number]         Date:  2024      Surgeon: Surgeon(s):  Sujit Rockwell MD    Procedure: Procedure(s):  RIGHT GROIN EXPLORATION WITH ARTERY REPAIR    Medications prior to admission:   Prior to Admission medications    Medication Sig Start Date End Date Taking? Authorizing Provider   aspirin 81 MG EC tablet Take 1 tablet by mouth daily 24   Mandy Patterson MD   atorvastatin (LIPITOR) 40 MG tablet Take 1 tablet by mouth nightly 24   Mandy Patterson MD   amLODIPine (NORVASC) 5 MG tablet Take 1 tablet by mouth daily 24   Mandy Patterson MD   pantoprazole (PROTONIX) 40 MG tablet Take 1 tablet by mouth every morning (before breakfast) 24   Mandy Patterson MD   nitroGLYCERIN (NITROSTAT) 0.4 MG SL tablet Place 1 tablet under the tongue every 5 minutes as needed for Chest pain up to max of 3 total doses. If no relief after 1 dose, call 911. 24   Mandy Patterson MD   Heparin Sod, Porcine, in D5W (HEPARIN, PORCINE,) 100 UNIT/ML SOLN infusion Infuse 567-3,402 Units/hr intravenously continuous 24   Mandy Patterson MD       Current medications:    Current Facility-Administered Medications   Medication Dose Route Frequency Provider Last Rate Last Admin   • phenylephrine (RENEE-SYNEPHRINE) 50 mg in sodium chloride 0.9 % 250 mL infusion ([id number])   mcg/min IntraVENous Continuous Evelyn Dodge PA-C 54 mL/hr at 24 2251 180 mcg/min at 24 225   • niCARdipine (CARDENE) 25 mg in sodium chloride 0.9 % 250 mL infusion ([id number])  2.5-15 mg/hr IntraVENous Continuous Evelyn Dodge PA-C   Stopped at 24 1237   • dexmedeTOMIDine (PRECEDEX) 400 mcg in sodium chloride 0.9 % 100 mL infusion  0.1-1.5 mcg/kg/hr IntraVENous Continuous Evelyn Dodge PA-C   Stopped at 24   • 0.9 % sodium chloride infusion   IntraVENous Continuous Evelyn Dodge PA-C 6 mL/hr at

## 2024-01-13 NOTE — BRIEF OP NOTE
Brief Postoperative Note      Patient: Sahaar Neff  YOB: 1950  MRN: [id number]    Date of Procedure: 1/12/2024    Pre-Op Diagnosis Codes:     * Groin hematoma, subsequent encounter [S30.1XXD]    Post-Op Diagnosis: Same       Procedure(s):  COVERED STENT PLACEMENT OF RIGHT EXTERNAL ILIAC ARTERY EXPLORATION OF LEFT GROIN FOR CONTROL OF BLEEDING    Surgeon(s):  Sujit Rockwell MD    Assistant:  Surgical Assistant: Erna Santoro    Anesthesia: General    Estimated Blood Loss (mL): 400    Complications: None    Specimens:   * No specimens in log *    Implants:  Implant Name Type Inv. Item Serial No.  Lot No. LRB No. Used Action   GRAFT STENT [id number] HPRNCOAT - [id number] Vascular grafts GRAFT STENT [id number] HPRNCOAT [id number]  [employer]- [id number] Right 1 Implanted         Drains:   Chest Tube Anterior Mediastinal 1 (Active)   Chest Tube Airleak No 01/12/24 2000   Status Continuous Suction 01/12/24 2000   Suction -20 cm H2O 01/12/24 2000   Y Connector Used Yes 01/12/24 2000   Drainage Description Sanguinous 01/12/24 2000   Dressing Status Clean, dry & intact 01/12/24 2000   Chest Tube Dressing Dry 01/12/24 2000   Site Assessment Dry;Intact 01/12/24 2000   Surrounding Skin Dry;Intact 01/12/24 2000       Chest Tube Anterior Mediastinal 2 (Active)   Chest Tube Airleak No 01/12/24 2000   Status Continuous Suction 01/12/24 2000   Suction -20 cm H2O 01/12/24 2000   Y Connector Used Yes 01/12/24 2000   Drainage Description Sanguinous 01/12/24 2000   Dressing Status Clean, dry & intact 01/12/24 2000   Chest Tube Dressing Dry 01/12/24 2000   Site Assessment Dry;Intact 01/12/24 2000   Surrounding Skin Dry;Intact 01/12/24 2000       Chest Tube Anterior Mediastinal 3 (Active)   Chest Tube Airleak No 01/12/24 2000   Status Continuous Suction 01/12/24 2000   Suction -20 cm H2O 01/12/24 2000   Y Connector Used Yes 01/12/24 2000   Drainage Description Sanguinous 01/12/24 2000

## 2024-01-14 NOTE — DEATH NOTES
Death Pronouncement Note  Patient's Name: Sahara Neff   Patient's YOB: 1950  MRN Number: [id number]    Admitting Provider: Aldair Greenfield MD  Attending Provider: Aldair Greenfield MD    Patient was examined and the following were absent: Pulses, Blood Pressure, and Respiratory effort    I declared the patient dead on 1/14/2024 at 4:20 PM    Preliminary Cause of Death: Shock (HCC)     Electronically signed by Can Ibarra DO on 1/14/24 at 4:30 PM EST

## 2024-01-14 NOTE — CONSULTS
NEPHROLOGY CONSULT NOTE     Patient: Sahara Neff MRN: [id number]  PCP: Arianna Chilel MD   :     1950  Age:   73 y.o.  Sex:  female      Referring physician: Aldair Greenfield MD  Reason for consultation:   Admission Date: 2024  3:55 PM  LOS: 3 days        ASSESSMENT and PLAN :   Acute kidney injury - ATN in the setting of hypovolemic shock and distributive shock  -oligoanuric  - sCr of 3.28 with normal relatively normal baseline of 0.86 on     Shock hemorrhagic and distributive  -requiring norepi, epi, vaso and phenylephrine    Severe lactic acidosis  - increasing lactic acid from -  - given bicarb pushes and on bicarb drip    Acute anemia  - received 5 units PRBC, 3 FFP and 1 plt.    CAD   - s/p CABGx3  Respiratory failure  Hematoma  DM    PLAN  Continue bicarb drip  Continue vasopressors    We were called to see patient for the evaluation of acute kidney injury and CRRT evaluation.  Called daughter, Bryanna Mena in regards to the patient's poor prognosis. Discussed the risks and   benefits CRRT. I discussed the risk of dialysis and the chance that her mother might not tolerate the   procedure.   Daughter visited the patient and decided against aggressive measures and decided that they do not want  CRRT as a treatment choice.         Subjective:   HPI: Sahara Neff is a 73 y.o.  female who had CAD s/p CABGx3, on IABP that developed complications after removal of IABP. After the IABP was removed, patient had to go to OR by vascular surgery for artery repair. Patient then received 5 units PRBC, 3 FFP and 1 plt.  Over the course of the day, patient required more vasopressor support while lactic acid continued to increase.   Unable to obtain ROS as patient was sedated.     Past Medical Hx:   Past Medical History:   Diagnosis Date    Atrial fibrillation (HCC)     CAD (coronary artery disease)     Diabetes mellitus (HCC)     Hyperlipidemia     Hypertension         Past 
Transfer center called [facility] and spoke to Dr. Adam who accepted patient for planned CABG with [facility] tomorrow. Intensivist will admit overnight. Full consult to follow.     On admission to [facility], please release orders under signed and held titled \"Admission to [facility] from [facility]\" to attempt to get a few labs and tests completed that were not done at OSH.     From Cardiac Surgery perspective, we request the following:  - Release orders as discussed above  - Stop heparin infusion at midnight  - No Plavix or Brilinta  - No ACE or ARB for BP management    Thank you,   Angela Conteh NP  Cardiac Surgery   
(non-ST elevated myocardial infarction) (HCC) [I21.4]     Current Treatment     TX: heparin infusion/ CTS evaluation    Hospital Course   Clinical progress has been complicated by NSTEMI in setting of severe CAD.     1/12: s/p CABG x3   Diabetes History   T2D, controlled with oral medications.  Noted she was established with a PCP in [place] in April 2022 with noted encounter June 2022, and recent refill of diabetes medications in June 2023.     Diabetes-related Medical History  Microvascular disease  UTD  Macrovascular disease  CAD and myocardial infarction-NSTEMI this admission  Other associated conditions     HLD/HTN/ afib    Diabetes Medication History  Diabetes drug class Diabetes drug name Additional Comments   Biguanide  Metformin 1000mg BID    SGLT-2 inhibitors Jardiance 10mg daily      Diabetes self-management practices: deferred today; remains intubated and medically sedated  Eating pattern     Physical activity pattern    Monitoring pattern    Taking medications pattern    Reducing risks  [] Influenza:      [] Pneumonia:     [] Hepatitis:    []         Social determinants of health impacting diabetes self-management practices        Overall evaluation:    [x] Achieving A1c target with drug therapy & self-care practices    Subjective   Face to face deferred today due to acutely post op from cardiac surgery; intubated and medically sedated     Objective   Physical exam  General Obese/ female in no acute distress. Intubated  Neuro  Medically sedated  Vital Signs   Vitals:    01/12/24 1342   BP:    Pulse:    Resp: 16   Temp:    SpO2:      Skin  Warm and dry.  Heart   Regular rate and rhythm. No murmurs, rubs or gallops  Lungs  Clear to auscultation without rales or rhonchi  Extremities No foot wounds    Laboratory  Recent Labs     01/11/24  1640 01/12/24  0402 01/12/24  1241   WBC 12.1* 10.6 23.2*   HGB 14.4 14.2 12.9   HCT 43.1 44.3 38.8   MCV 86.4 88.4 87.4    196 140*     Recent Labs     
MD   nitroGLYCERIN (NITROSTAT) 0.4 MG SL tablet Place 1 tablet under the tongue every 5 minutes as needed for Chest pain up to max of 3 total doses. If no relief after 1 dose, call 911. 1/11/24   Mandy Patterson MD   Heparin Sod, Porcine, in D5W (HEPARIN, PORCINE,) 100 UNIT/ML SOLN infusion Infuse 567-3,402 Units/hr intravenously continuous 1/11/24   Mandy Patterson MD       No Known Allergies    Review of Systems:   Consititutional: Denies fever or chills.  Eyes:  Denies use of glasses or vision problems(cataracts).  ENT:  Denies hearing or swallowing difficulty.  CV: Denies CP, claudication, HTN.  Resp: Denies dyspnea, productive cough.  : Denies dialysis or kidney problems.  GI: Denies ulcers, esophageal strictures, liver problems.  M/S: Denies joint or bone problems, or implanted artificial hardware.  Skin: Denies varicose veins, edema.   Neuro: Denies strokes, or TIAs.  Psych: Denies anxiety or depression.  Endocrine: Denies thyroid problems or diabetes.  Heme/Lymphatic: Denies easy bruising or lymphedema.     Objective:     Vitals  Temp: 97.9  Blood Pressure: 134/69  Heart Rate: 63  O2 Sats: 99%      Physical Exam:    General appearance: Obese female, NAD  Head: normocephalic, without obvious abnormality; atraumatic  Eyes: conjunctivae/corneas clear; EOM's intact.   Nose: nares normal; no drainage.  Neck: no carotid bruit and no JVD  Lungs: clear to auscultation bilaterally  Heart: regular rate and rhythm; no murmur. Balloon pump in place.  Abdomen: soft, non-tender; bowel sounds normal  Extremities: moves all extremities; no weakness.  Skin: Skin color normal; No varicose veins or edema.  Neurologic: Grossly normal      Labs:   Recent Labs     01/11/24  0632 01/11/24  1000   WBC 9.9  --    HGB 14.6 14.7   HCT 45.1* 45.3*     --      --    K 4.2  --    BUN 13  --    INR 1.0  --        Diagnostics:   PA and lateral: Chest xray  Xray Result (most recent):  XR CHEST PORTABLE 01/10/2024    Narrative  INDICATION:

## 2024-01-15 LAB
ABO + RH BLD: NORMAL
ALBUMIN SERPL-MCNC: 2 G/DL (ref 3.5–5)
ALBUMIN/GLOB SERPL: 1.2 (ref 1.1–2.2)
ALP SERPL-CCNC: 79 U/L (ref 45–117)
ALT SERPL-CCNC: >3500 U/L (ref 12–78)
ANION GAP SERPL CALC-SCNC: 37 MMOL/L (ref 5–15)
AST SERPL-CCNC: >2000 U/L (ref 15–37)
BACTERIA SPEC CULT: NORMAL
BILIRUB SERPL-MCNC: 0.6 MG/DL (ref 0.2–1)
BLD PROD TYP BPU: NORMAL
BLOOD BANK DISPENSE STATUS: NORMAL
BLOOD GROUP ANTIBODIES SERPL: NORMAL
BPU ID: NORMAL
BUN SERPL-MCNC: 30 MG/DL (ref 6–20)
BUN/CREAT SERPL: 8 (ref 12–20)
CALCIUM SERPL-MCNC: 7.6 MG/DL (ref 8.5–10.1)
CHLORIDE SERPL-SCNC: 113 MMOL/L (ref 97–108)
CO2 SERPL-SCNC: 10 MMOL/L (ref 21–32)
CREAT SERPL-MCNC: 3.93 MG/DL (ref 0.55–1.02)
CROSSMATCH RESULT: NORMAL
EKG ATRIAL RATE: 94 BPM
EKG DIAGNOSIS: NORMAL
EKG P AXIS: 27 DEGREES
EKG P-R INTERVAL: 148 MS
EKG Q-T INTERVAL: 370 MS
EKG QRS DURATION: 94 MS
EKG QTC CALCULATION (BAZETT): 462 MS
EKG R AXIS: -30 DEGREES
EKG T AXIS: 111 DEGREES
EKG VENTRICULAR RATE: 94 BPM
GLOBULIN SER CALC-MCNC: 1.7 G/DL (ref 2–4)
GLUCOSE SERPL-MCNC: 103 MG/DL (ref 65–100)
GRAM STN SPEC: NORMAL
HGB BLD-MCNC: 5.8 G/DL (ref 11.5–16)
POTASSIUM SERPL-SCNC: 4.2 MMOL/L (ref 3.5–5.1)
PROT SERPL-MCNC: 3.7 G/DL (ref 6.4–8.2)
SERVICE CMNT-IMP: NORMAL
SERVICE CMNT-IMP: NORMAL
SODIUM SERPL-SCNC: 160 MMOL/L (ref 136–145)
SPECIMEN EXP DATE BLD: NORMAL
UNIT DIVISION: 0
VAS LEFT ABI: 1.14
VAS LEFT CCA DIST EDV: 7.3 CM/S
VAS LEFT CCA DIST PSV: 66.6 CM/S
VAS LEFT CCA PROX EDV: 11.3 CM/S
VAS LEFT CCA PROX PSV: 98.1 CM/S
VAS LEFT DORSALIS PEDIS BP: 147 MMHG
VAS LEFT ECA EDV: 10.6 CM/S
VAS LEFT ECA PSV: 96.3 CM/S
VAS LEFT ICA DIST EDV: 22.5 CM/S
VAS LEFT ICA DIST PSV: 65.5 CM/S
VAS LEFT ICA MID EDV: 23.7 CM/S
VAS LEFT ICA MID PSV: 68 CM/S
VAS LEFT ICA PROX EDV: 13.8 CM/S
VAS LEFT ICA PROX PSV: 71 CM/S
VAS LEFT ICA/CCA PSV: 1.1 NO UNITS
VAS LEFT PTA BP: 155 MMHG
VAS LEFT VERTEBRAL EDV: 10.7 CM/S
VAS LEFT VERTEBRAL PSV: 33.4 CM/S
VAS RIGHT ABI: 1.18
VAS RIGHT ARM BP: 136 MMHG
VAS RIGHT CCA DIST EDV: 7.7 CM/S
VAS RIGHT CCA DIST PSV: 49.4 CM/S
VAS RIGHT CCA PROX EDV: 6.4 CM/S
VAS RIGHT CCA PROX PSV: 75.5 CM/S
VAS RIGHT DORSALIS PEDIS BP: 135 MMHG
VAS RIGHT ECA EDV: 19.5 CM/S
VAS RIGHT ECA PSV: 149 CM/S
VAS RIGHT ICA DIST EDV: 16.3 CM/S
VAS RIGHT ICA DIST PSV: 45.6 CM/S
VAS RIGHT ICA MID EDV: 21.1 CM/S
VAS RIGHT ICA MID PSV: 46.6 CM/S
VAS RIGHT ICA PROX EDV: 0 CM/S
VAS RIGHT ICA PROX PSV: 242.6 CM/S
VAS RIGHT ICA/CCA PSV: 4.9 NO UNITS
VAS RIGHT PTA BP: 161 MMHG
VAS RIGHT VERTEBRAL EDV: 4.8 CM/S
VAS RIGHT VERTEBRAL PSV: 26 CM/S

## 2024-01-15 NOTE — DISCHARGE SUMMARY
Cardiac Surgery Discharge Summary     Patient ID:  Sahara Neff  [id number]  73 y.o.  1950    Admit date: 2024    Discharge date: 1/15/2024     Admitting Physician: Aldair Greenfield MD     Referring Cardiologist:  Dr. Roberto Oseguera     PCP:  Arianna Chilel MD    Admitting Diagnoses:  CAD    Discharge Diagnoses:     1. Disease of cardiovascular system    2. NSTEMI (non-ST elevated myocardial infarction) (HCC)    3. S/P CABG x 3        Discharged Condition:      Disposition:     Procedures for this admission:   24: CORONARY ARTERY BYPASS GRAFTING (CABG) X 3; LIMA TO LAD; [facility] OF LLE; LIMA- LAD, SVG-OM1 and PDA. ECC; SHAN & EPIAORTIC US BY DR. MONROE - PATIENT HAS A BALLOON PUMP.       24 COVERED STENT PLACEMENT OF RIGHT EXTERNAL ILIAC ARTERY EXPLORATION OF LEFT GROIN FOR CONTROL OF BLEEDING    Discharge Medications:      Medication List        ASK your doctor about these medications      amLODIPine 5 MG tablet  Commonly known as: NORVASC  Take 1 tablet by mouth daily     aspirin 81 MG EC tablet  Take 1 tablet by mouth daily     atorvastatin 40 MG tablet  Commonly known as: LIPITOR  Take 1 tablet by mouth nightly     heparin (porcine) 100 UNIT/ML Soln infusion  Infuse 567-3,402 Units/hr intravenously continuous     nitroGLYCERIN 0.4 MG SL tablet  Commonly known as: NITROSTAT  Place 1 tablet under the tongue every 5 minutes as needed for Chest pain up to max of 3 total doses. If no relief after 1 dose, call 911.     pantoprazole 40 MG tablet  Commonly known as: PROTONIX  Take 1 tablet by mouth every morning (before breakfast)              HPI:  Sahara Neff is a 73 y.o. female who was referred for cardiac evaluation by Dr. Oseguera. She has a PMH of CAD (PCI to RCA and LAD 11 years ago), +NSTEMI this admission, atrial fibrillation s/p ablation, HTN, HLD, DM Type II, and ANDREW (CPAP).      She initially presented to the ED on 1/10 with complaints of chest pain with radiation to the

## 2024-01-15 NOTE — PROGRESS NOTES
CRITICAL CARE NOTE      Name: Sahara Neff   : 1950   MRN: [id number]   Date: 2024      Reason for ICU Admission: NSTEMI     ICU PROBLEM LIST   NSTEMI  CAD s/p distant PCI to RCA and new critical L main disease  ANDREW on CPAP  HTN  Afib s/p ablation  HLD  Obesity    HISTORY OF PRESENT ILLNESS:   Pt is a 73 y.o F w/ PMH as noted above who presents to CCU from [facility] for CTS evaluation and CABG. Pt presented to OSH 2/2 progressive dyspnea and CP w/ associated back pain. Pt diagnosed w/ NSTEMI, started on heparin gtt and underwent LHC w/ critical L main disease and PDA. IABP placed by OSH cardiology given severity of coronary disease for coronary perfusion. States HERRING past several months w/ negative stress test. [facility] CTS consulted via transfer center and pt transferred to [facility]. Arrived to CCU, NAD, VSS. IABP in place at 1:1    24 HOUR EVENTS:   Some back pain, otherwise no acute events o/n. Doing well this AM, to OR for CABG    NEUROLOGICAL:    Mentation intact  PRN pain regimen  Delirium precautions    PULMONOLOGY:   O2 per NC for spO2 92-98%    CARDIOVASCULAR:   CTS consulted, CABG today  IABP in place, CXR for surveillance, will be removed intra op  Heparin gtt, held for OR  Statin  Hold AC or antiplatelets pre-op  PRN NTG for CP  Bed rest, neurovascular checks    GASTROINTESTINAL:   NPO    RENAL/ELECTROLYTE/FLUIDS:   Strict I/Os  Mg/Phos/K >2/3/4    ENDOCRINE:   Goal euglycemia    HEMATOLOGY/ONCOLOGY:   Heparin gtt    ID/MICRO:   No acute issues    ICU DAILY CHECKLIST     Code Status:Full  DVT Prophylaxis:Heparin gtt  T/L/D: PIV, A line, IABP  SUP: NA  Diet: Regular, NPO  Activity Level:bed rest  ABCDEF Bundle/Checklist Completed:Yes  Disposition: Stay in ICU  Multidisciplinary Rounds Completed:  Pending  Patient/Family Updated: Yes      Peripheral IV 01/10/24 Right Forearm (Active)   Site Assessment Clean, dry & intact 24 0745   Line Status Infusing 24 0745   Line Care Connections 
     [facility]    ICU TEAM Progress Note    Name: Sahara Neff   : 1950   MRN: [id number]   Date: 2024      Assessment/Plan:     Shock, distributive/hypovolemic  Transient improvement w/ methylene blue  Vasoplegia, lactate driven most likely  Epi, Levo, Toby, Vaso   Grim prognosis  D/w CTS, Pharmacy, Nephrology - will attempt slow methylene blue infusion  We do not have Angiotensin infusion here on formulary - if methylene blue infusion is not beneficial, would consider trying to acquire angiotensin infusion as a last resort  NSTEMI, CAD s/p CABG3 -  Fiser  Post-Op EF 'normal'  Difficult windows for POCUS, ?utility for formal TTE vs SHAN  As above  Groin bleed at IABP site  S/p Rt   Vascular following  Bleeding controlled/stopped  H/H steady  Concern for impending compartment syndrome - distal pulses remain present  Left groin bleed easily resolved  SUBHA  Nephrology following  Oligoanuric  Pt would not tolerated CRRT at this time, but Dtr open to it should the patient improve to the point of tolerance  ABLA  Controlled/Resolved  Multiple blood products received, not required for 24h  H/h steady  Lactic metabolic acidosis  Severe and seemingly out of proportion to illness  ?gut ischemia, ?limb ischemia d/t compression by hematoma  Hematoma softer today, distal pulses present  Too unstable for abd imaging  Too unstable for potential surgical intervention  Shock/ischemic liver  D/t above  BP support  Acute hypoxic respiratory failure  Initially extubated postOp, reintubated for return to OR  Now hypoxic d/t volume overload  Wean vent support as able  Afib s/p ablation  Po amio  HTN  ANDREW on CPAP  Currently intubated  HLD  Hold statin d/t LFT elevation  Morbid obesity  Complicates all aspects of care, is an independent risk factor for poor outcomes  COVID: n/a  SBP Goal of: > 90 mmHg and < 160 mmHg  MAP Goal of: > 65 mmHg  IVFs: prn  Transfusion Trigger (Hgb): <8 g/dL  Respiratory 
  Patient compassionately extubated  
..TRANSFER - IN REPORT:    Verbal report received from Marcelo Nicole RN on MichelleAngela Neff  being received from [facility] for ordered procedure      Report consisted of patient's Situation, Background, Assessment and   Recommendations(SBAR).     Information from the following report(s) Nurse Handoff Report, ED Encounter Summary, Surgery Report, Recent Results, Cardiac Rhythm Sinus Triston, Quality Measures, Neuro Assessment, and Event Log was reviewed with the receiving nurse.    Opportunity for questions and clarification was provided.      Assessment completed upon patient's arrival to unit and care assumed.      1550: Patient arrival on unit    1645: Cardiac surg @bedside.                 
0800 Bedside shift change report given to Shannan RN (oncoming nurse) by Bairon RN (offgoing nurse). Report included the following information Nurse Handoff Report.     ABG- 6.99/40.9/90/9.9/90.6%. Ionized Ca- 1.07. Intensivist aware- RN  to give 2 amps of bicarb and Ca chloride push- see MAR.    0900 Lactic acid >30- NP Tali and Intensivist informed.     0920 Vascular Surgery, Dr Rockwell, at bedside. RN to perform neurovascular checks q4h.    1010 ABG- 6.99/39.5/91/9.6/91%. Ionized Ca- 1.08. Intensivist aware- RN  to give 2 amps of bicarb and Ca chloride push- see MAR.    1045 Cardiac Surgery at bedside with Intensivist- plan to start methylene blue infusion. Surgeon spoke to daughter regarding need for CRRT- daughter agreeable. Dr Segal notified by NP Tali.    1100 Dr Segal at bedside with CT surgery team- patient is too critical to be initiated on CRRT at this time. Dr Segal spoke to daughter. Pastoral care called for emotional support given guarded prognosis.    1205 Methylene blue arrived from pharmacy- administered- see MAR. SBP sustaining in 60s- 2 amps of bicarb given and Ca chloride push given per Intensivist.     1215 STAT 2D Echo ordered. Tech informed- arriving to CVICU around 1245.    1250 2D ECHO in process at bedside.    1400 New mottling noted on abdomen and upper thighs.ABG- 7.09/34.9/80/10.6/90.4%. Ionized Ca- 1.01. Intensivist aware of abgs and mottling- RN  to give 2 amps of bicarb and Ca chloride push- see MAR.     1420 MAPs remain low in 50s. HR elevated between 120-160bpm, in a fib. Intensivist informed. RN to double to methylene blue infusion to 33.4ml/hr per MD.    1550 Daughter at bedside with patients - would like to stop all treatment, including turning off gtts and extubating the patient. Intensivist informed- he called and spoke with Dr Arana via phone. From MD-to-MD conversation, withdraw of care appropriate.    1610 Patient pre-medicated per MD- 4mg versed, 1mg dilaudid- 
1930 Bedside shift change report given to Bairon RN (oncoming nurse) by Shannan RN (offgoing nurse). Report included the following information Nurse Handoff Report.    Assessment performed, rate verify. Pt V paced @ 86 ( underlying HR SB 45). Pt A&O x4. Low BP, CVP 6. Bruising, noted to right groin soft to touch., area marked. Pulses palpable.     2000: Dex paused. BP 82/55 (64) Toby Increased 100 mcg. Intensivist at bedside. Received order for albumin.     2030; Albumin given.     2100: Toby @ 200 mcg.  BP 79/48    2120: H&H 23.1/7.7 Co2 14 crea 1.34, K 5.4, Mag 2.7  CV Surgery PA called and given update.     2130: Bruising increased to right groin increased firmness noted. Manual pressure and femstop applied. Toby @ 300 mcg    2136: Levo started. Pt converted to NSR 90     2152: Intensivist @ bedside. Verbal order for 2 unit prbc.     2200: CV Surgery PA given update. Vascular Paged.     2208: PRBC x 2 given .     2230: 7.17/21.5/91/7.8/94.7 Lactic 11.8  H&H 8.3/25.8 Pt placed on bipap  Vasular @ bedside. Pt daughter @ bedside.     2300: Anesthesia @ bedside.  PRBC x1 given     2330: PRBC x 1 given     2340: Albumin x 2 given, Calcuim and bicarb given. .     2345: pt transfer to OR     0330: PT received from OR. MD notified.   CBC CMP ABG  performed. Albe to doppler BLE pulses x 4  Plt 21 H&H 6.4 19.8, latic 14.3    0430: bicarb given  Plt given x 1     0500: Prbc given x1.     0600: ABG and EKG performed. 7.31/36.1/99.5/18.1, Calcium 1.12  Pt daughter given update.     0630: Prbc given X 1. PT able to follow commands and move extremities x 4     0730: Bedside shift change report given to Shannan PIMENTEL (oncoming nurse) by Lewis (offgoing nurse). Report included the following information Nurse Handoff Report.    
1930 Bedside shift change report given to Robina RN (oncoming nurse) by Beata/Liudmila RN (offgoing nurse). Report included the following information Nurse Handoff Report, Index, Intake/Output, MAR, Recent Results, and Cardiac Rhythm SB .     2002 One time dose of Toradol admin     2100 Sanders placed. Urinalysis sent     0000 Heparin gtt stopped per CTS    0400 Labs drawn. CHG bath, new linens & gown. Pt shaved & hair washed     0640 Anesthesia at bedside to place L radial art line & central line. Fent & versed admin     0653 PRN Zofran admin for nausea                
2000 Bedside shift change report given to Bairon RN (oncoming nurse) by Shannan RN (offgoing nurse). Report included the following information Nurse Handoff Report.      R groin site assessed with off-going RN- hematoma  softer to touch.  Neurovascular check completed- BLE pulses dopplerable. 1 Amp of bicarb given. Labs collected. Epi increased to 6mcg. Intensivist at bedside. Bedside Echo completed    2100: MD (Intensivist) at bedside. Epi increased to 8mcg. Bicarb increased to 150. 1 amp of bicarb given. Blood cultures, collected x2. CV MD ( DT) given update  Abg collected. 7.05/ 41.3/86/91 C 1.06. Kub performed. K 4.9 crea 3.28. AST & Alt > 2000. H&H 7.3 & 21.6    2140: levo increased to 30 mcg, solu cortef given.    2200: Nephro paged.1 prbc given, 1 amp of bicarb, 1g of cacl given. Pt daughter given updated over phone. Epi Increased to 10 mcg    2300: pt converted to Afib.  Sodium bicarb given x 2. . Pt daughter & Intensivist at bedside.AbG collected. ABG 6.91/44.3/90/8.8/88 CA+ 1.16 Prop paused.     2323: D50 given for low blood glucose.     2330; levo decreased to 26 mcg, Methylene blue given.     0000: Nephro ( NP Tanika) at bedside. Lactic collected. (27.8)  Levo decreased to 25 mcg    0100: 1 prbc given. 2amps of bicarb given Abg collected. Levo decreased to 24 mcg, sabrina decreased to 40. ABG 6.89/ 49.1/ 115/9.4/ 93.4. MD at bedside (Intensivist)  .     0200: Type and Screen collected. Pt Daughter and granddaughter at beside. Given update     0300: 1 amp of bicarb given, 1 g of calcuim given. ABG 6.91/45/84/9/85.8 Ca+ 1.02. MD at bedside (Intensivist)       0400: Labs collected. K 4.2 co2 10, lactic 29.5, crea 3.93. AST, ALT > 2000, H&H 9.3 30 plt 101    0600: sabrina increased to 60mcg. Abg 6.90/42.3/87/8.4/ 87. Ca+ 1.08. MD at bedside (Intensivist) .     0630: 1g of calcuim given       
4 Eyes Skin Assessment     NAME:  Sahara Neff  YOB: 1950  MEDICAL RECORD NUMBER:  [id number]    The patient is being assessed for  Post-Op Surgical    I agree that at least one RN has performed a thorough Head to Toe Skin Assessment on the patient. ALL assessment sites listed below have been assessed.      Areas assessed by both nurses:    Head, Face, Ears, Shoulders, Back, Chest, Arms, Elbows, Hands, Sacrum. Buttock, Coccyx, Ischium, Legs. Feet and Heels, and Under Medical Devices         Does the Patient have a Wound? No noted wound(s)       Bradley Prevention initiated by RN: Yes  Wound Care Orders initiated by RN: No    Pressure Injury (Stage 3,4, Unstageable, DTI, NWPT, and Complex wounds) if present, place Wound referral order by RN under : No    New Ostomies, if present place, Ostomy referral order under : No     Nurse 1 eSignature: Electronically signed by MAURI BATES RN on 1/12/24 at 6:51 PM EST    **SHARE this note so that the co-signing nurse can place an eSignature**    Nurse 2 eSignature: Electronically signed by Sidney Jackson RN on 1/13/24 at 7:13 PM EST    
4 Eyes Skin Assessment     NAME:  Sahara Neff  YOB: 1950  MEDICAL RECORD NUMBER:  [id number]    The patient is being assessed for  Admission    I agree that at least one RN has performed a thorough Head to Toe Skin Assessment on the patient. ALL assessment sites listed below have been assessed.      Areas assessed by both nurses:    Shoulders, Back, Chest, Sacrum. Buttock, Coccyx, Ischium, Legs. Feet and Heels, and Under Medical Devices         Does the Patient have a Wound? No noted wound(s)       Bradley Prevention initiated by RN: Yes  Wound Care Orders initiated by RN: No    Pressure Injury (Stage 3,4, Unstageable, DTI, NWPT, and Complex wounds) if present, place Wound referral order by RN under : No    New Ostomies, if present place, Ostomy referral order under : No     Nurse 1 eSignature: Electronically signed by Liudmila Child RN on 1/11/24 at 6:34 PM EST    **SHARE this note so that the co-signing nurse can place an eSignature**    Nurse 2 eSignature: Electronically signed by Gabriela Rao RN on 1/11/24 at 6:36 PM EST   
Cardiac Surgery Coordinator- Unable to locate the family of Sahara Neff, placed call to Ms Mena. Reviewed day of surgery expectations and encouraged her to verbalize. She stated she will be waiting in the main surgical waiting area. Will continue to follow and update throughout the day.     0900 Met with the daughter of Sahara Neff, provided update from the OR     1230- Met with the daughter of Sahara Neff, provided update from Dr Adam. She will be waiting in the fourth floor waiting room.   
Cardiac Surgery ICU Progress Note    Admit Date: 2024  POD:  1 Day Post-Op    Procedure:    24: CORONARY ARTERY BYPASS GRAFTING (CABG) X 3; LIMA TO LAD; SVH OF LLE; LIMA- LAD, SVG-OM1 and PDA. ECC; SHAN & EPIAORTIC US BY DR. MONROE - PATIENT HAS A BALLOON PUMP.      IABP removed afternoon, noted to have worsening hypotension overnight.  Taken to OR by vascular surgery for below.   Received 5 units pRBC, 3 FFP, 1 platelets.     24:  COVERED STENT PLACEMENT OF RIGHT EXTERNAL ILIAC ARTERY EXPLORATION OF LEFT GROIN FOR CONTROL OF BLEEDING        Subjective:   Patient seen with Dr. Contreras. On levo 18, sabrina 60, insulin, propofol/precedex.   Intubated, fi02 50%.   In SR.    Extensive ecchymosis/hematoma to R groin/abd/R leg.        Objective:   Vitals:  Blood pressure (!) 79/45, pulse 76, temperature 98.6 °F (37 °C), resp. rate 20, height 1.702 m (5' 7.01\"), weight 113.4 kg (250 lb), SpO2 100 %, not currently breastfeeding.  Temp (24hrs), Av.3 °F (36.8 °C), Min:96.4 °F (35.8 °C), Max:100 °F (37.8 °C)       EKG/Rhythm:    SR    Extubation Date / Time:  n/a     CT Output: 415 ml (60 ml in last 4 hrs)    CXR: Xray Result (most recent):  XR CHEST PORTABLE 2024    Narrative  EXAM:  XR CHEST PORTABLE    INDICATION: Post op open heart surgery    COMPARISON: Chest radiograph 2024    TECHNIQUE: Supine portable chest AP view    FINDINGS:    Interval NG tube removal. Otherwise, stable support apparatus and surgical  hardware. Cardiomediastinal silhouette is stably enlarged. Stable mild edema  pattern. Stable patchy left basilar atelectasis/airspace disease. Decreasing  small left pleural effusion. No definite pneumothorax.    Impression  Stable mild edema pattern. Stable patchy left basilar atelectasis/airspace  disease. Decreasing small left pleural effusion.          Admission Weight: Last Weight   Weight - Scale: 113.4 kg (250 lb) Weight - Scale: 113.4 kg (250 lb)     Intake / Output / 
Cardiac Surgery ICU Progress Note    Admit Date: 2024  POD:  2 Days Post-Op    Procedure:    24: CORONARY ARTERY BYPASS GRAFTING (CABG) X 3; LIMA TO LAD; SVH OF LLE; LIMA- LAD, SVG-OM1 and PDA. ECC; SHAN & EPIAORTIC US BY DR. MONROE - PATIENT HAS A BALLOON PUMP.      IABP removed afternoon, noted to have worsening hypotension overnight.  Taken to OR by vascular surgery for below.   Received 5 units pRBC, 3 FFP, 1 platelets.     24:  COVERED STENT PLACEMENT OF RIGHT EXTERNAL ILIAC ARTERY EXPLORATION OF LEFT GROIN FOR CONTROL OF BLEEDING       : progressive severe acidosis, SUBHA.  Maxed on 4 pressors.  Methylene blue x 1 attempted.     Subjective:   Patient seen with Dr. Contreras. On levo 30, sabrina 70, vaso 0.04, Na bicarb 150, insulin, precedex.   Intubated, fi02 60%.   Sinus tachycardia vs. Afib.   Persistently hypotension, acidosis.  Has received multiple amps of NA bicarb, methylene blue x 1 overnight.      Objective:   Vitals:  Blood pressure (!) 78/50, pulse (!) 122, temperature (!) 96.6 °F (35.9 °C), temperature source Bladder, resp. rate 21, height 1.702 m (5' 7.01\"), weight 133.9 kg (295 lb 3.1 oz), SpO2 100 %, not currently breastfeeding.  Temp (24hrs), Av.9 °F (36.6 °C), Min:96.6 °F (35.9 °C), Max:99.3 °F (37.4 °C)       EKG/Rhythm:    ST vs. Afib     Extubation Date / Time:  n/a     CT Output: 500 ml    CXR: Xray Result (most recent):  XR CHEST PORTABLE 2024    Narrative  INDICATION:    Post op open heart surgery    EXAMINATION:  AP CHEST, PORTABLE    COMPARISON: 2024    FINDINGS: Single AP portable view of the chest at 427 hours demonstrates stable  appearance of the ET tube and right IJ line as well as a left chest tube. There  is no pneumothorax. An enteric tube enters the stomach. The lungs are  hypoinspiratory and there is improving left basilar atelectasis. The  cardiomediastinal silhouette is stable. There is no new airspace disease.    Impression  Improving left 
Continues to be acutely ill  No sign of active bleeding  Significant hematoma on right  No compartment syndrome  Does have the beginnings of some skin changes  Will likely require evacuation, but I think best to wait until more hemodynamically stable  Signals present in feet  Discussed in detail with patients daughter.   
Critical care note:  -73-year-old female status post CABG x 3 on January 12  - She had intra-aortic balloon pump prior to surgery.  This was removed on the 12th postoperatively around 1300 hrs.  - Patient extubated afterward, continue to require albumin and pressor support  - Later during the evening worsening hemodynamic and form of lower blood pressure, drop in CVP and increased requirement for pressors  - Assessing the patient noticing worsening of right groin hematoma, labs showed significant drop in hemoglobin  - Working diagnosis is right groin/possible retroperitoneal hematoma.  Patient unstable at this time for CTA.  -No significant output from chest tube, no worsening hypoxia  -Worsening of lactic acidosis, continue increasing pressor requirement  - Started fluid resuscitation with albumin, pending blood product arrival from blood bank.  - Discussed the situation with on-call vascular surgeon, who kindly agreed to evaluate the patient and advised that she may need emergent OR exploration  -Called patient daughter, she arrived shortly at bedside and discussed current condition with her.  -In total received 4 units packed RBC prior to OR, received bicarb and calcium chloride  -Discussed with anesthesiologist at bedside  -On-call cardiac surgeon notified of this acute event.    -In the OR she had stent placement of the right external iliac artery, she also seemed to start bleeding on the left side and had left groin exploration to control bleeding [pending full operative report]  - She received another 4 units of packed RBC and 4 units of FFP with estimated blood loss of around 400 cc  - She arrived to the ICU intubated on mechanical ventilation,  -Will keep her intubated for now, sedation as needed, pressors as needed to maintain MAP above 65  -Ordered complete set of lab, pending result    In total patient received 8 unit packed RBC, 4 units FFP, multiple albumin, multiple bicarb and calcium chloride.Ordered 
Events noted  Will address groin if conditions change.  
Occupational Therapy  01/12/24    Orders received, chart review completed. Note patient POD #0 sp CABG x3. OT will follow up tomorrow for evaluation. Recommend OOB to chair three times a day for meals, self-completion of ADLs as able and medically stable.     Thank you,   BEENA Cosme, OTR/L      
Occupational Therapy  1/14/2024    Chart reviewed. Patient remains intubated/sedated and on pressor support following emergent stent placement. Family declining CRRT. Patient is not medically appropriate for OT re-evaluation at this time, prognosis poor per intensivist. Will sign off at this time. Thank you.       Mildred Wills, OTJOANNE, OTR/L    
Occupational Therapy:     Chart reviewed in prep for OT evaluation and tx. Overnight events noted - pt emergently returned to OR for stent placement of R external iliac, L groin exploration d/t extensive post-operative bleeding, and massive transfusion (>8 units PRBC, 4 units FFP, 1 unit Platelets). Additionally, pt remains intubated (FiO2 60%, PEEP 5), sedated, and not medically appropriate for participation in therapy services at this time. Will hold and continue to follow.     Thank you,   Aline Costello, OTJOANNE, OTR/L    
PCCM Brief    Attempted POCUS. Poor windows d/t body habitus. Ventricular contraction likely adequate to slightly reduced. No discernable effusion. CVP adequate.    Epi started, added to Levo, Toby, Vaso.     Signed out to o/n intensivist. Methylene blue under consideration for likely vasoplegia. Benefit to addition of Dobutamine?    Lactate steady 10-12 range, but not improving. Bicarb gtt added. BP improved w/ bicarb amp, but transient. Concern for other ischemic process related to hematoma. Distal pulses remain present.     Nephrology to see re SUBHA for likely CRRT w/ worsening UOP.     CCT = additional 40 min    Can Ibarra DO    
PCCM Brief    Methylene blue infusion at 0.25 mg/kg/hr initiated. After 2h, no improvement in BP or pressor requirements. Increased rate to 0.5 mg/kg/hr.     D/w pharmacy. Giapreza nonformulary. Requested to attempt to acquire nonformulary. Per pharmacy, this was declined by admin.     Will continue with methylene blue. Max rate would be 1 mg/kg/hr, but as there has been no improvement, I would not expect any further efficacy at this time.     Lactate remains > 30 (scale max).     CTS updated.     CCT = 28 min, additive    Can Ibarra, DO        
PCCM Brief    Pt's daughter requested meeting. Met at bedside with daughter, and then with multiple family members, [employer] members present in waiting room.     Status updated. Discussed continued clinical worsening despite exhaustive best efforts. Remains on max 4 pressors. Lactate >30. Methylene blue no longer effective. Unable to obtain Giapreza from OSH per admin. CRRT currently contraindicated per Nephrology. Unable to push pH higher than 7 with near constant sodium bicarb IVPs. ECMO contraindicated     Questions sought and answered to their satisfaction.     Daughter states that it would be against the patient's wishes to continue with potentially futile efforts with full available and indicated life supportive efforts having been exhausted. She wishes to withdraw support, to allow a natural death with comfort and dignity.     Discussed with CTS who are in agreement with withdrawal of support.     26 min spent    Can Ibarra,     
Patient . Follow-up visit for family; patient's daughter, Bryanna and friend, Miguel were in the waiting room with nurse, Cyndie. Following through on next steps and plan.    present just to add whatever ramesh or peace to complete daughter's moments here at [place].  encouraged daughter this moment is about what is necessary for HER.   Daughter expressed appreciation.  expressed appreciation.   Crystal Benedict MS.  Intern.  For spiritual care referrals, please call [phone] (PRAY).  
Patient death recorded on the [facility] internal restraint log on 1/14/2024 at 1923.      
Patient seen and examined  Large hematoma right groin with dropping Hgb and hemodynamic instability  Plan emergent OR for angiographic evaluation and hopeful rectification  
Patient was seen and examined again.  This note is note written by our nurse merle earlier this morning.  Spoke to the critical care, CT surgery team discussed with nursing staff.  Patient remains critically ill.  Remains on 4 pressors with escalating requirements.  She remains anuric with a lactate of 32.  Spoke to the surgery team and also spoke to the patient power of  her daughter in person.  Patient currently is to unstable to be placed on any form of RRT.  Continue present care.  Spoke to daughter about addressing goals of care.  Guarded prognosis.  
Pharmacist Note - Vancomycin Dosing    Consult provided for this 73 y.o. female for indication of sepsis.  Antibiotic regimen(s): vancomycin + cefepime  - cefazolin 2G IV q6h post-op - - TORB to Dr. Plummer that cefazolin will be discontinued due to duplicate coverage  Patient on vancomycin PTA? NO     Recent Labs     24  0757 24  1214 24  1551 24  1557 248 24   WBC 18.3* 19.7*  --  20.6*  --  24.4*   CREATININE 2.06*  --  2.78*  --  3.0* 3.28*   BUN 18  --  23*  --   --  27*     Frequency of BMP: Daily through   Height: 170.2 cm  Weight: 113.4 kg  Est CrCl: 20 ml/min; UO: --- ml/kg/hr  Temp (24hrs), Av.1 °F (36.7 °C), Min:96.4 °F (35.8 °C), Max:99.3 °F (37.4 °C)    Cultures:   blood 1 - ngtd, pending   blood 2 - ngtd, pending    MRSA Swab ordered (if applicable)? YES    Therapy will be initiated with a loading dose of 2500 mg IV x 1.  Given patient current clinical picture and declining renal function, vancomycin will be dosed by levels. Pharmacy to follow patient daily and order levels / make dose adjustments as appropriate.  
Physical Therapy    Chart reviewed and events noted.  Patient remains too medically unstable for PT assessment at this time.  We will follow up with PT when she is medically ready.    Virginia Dunham, PT    
Physical Therapy  01/12/24    Orders received, chart review completed. Note patient POD #0 s/p CABG x3. IABP in place. Will follow for PT evaluation POD #1 as able.    Thank you,   Gemini Day, PT, DPT          
Physical Therapy  1/14/2024    Chart reviewed. Family has decided against CRRT. Prognosis grim per intensivist and CSS NP. Will sign off. Thank you.    Lorena Cordova, PT, DPT  
Pt arrived to OR via bed, with anesthesia and 2 OR staff, IABP in place.  Pt name and /procedure & surgeon verified with armband and consent form.  Pt transferred to OR bed with CRNA and four OR staff.  Pt's RIJ MAC, Left Radial Arterial line and RFA IABP remained intact, Radial line reconnected by perfusion.    Ancef 2g IVF given this day at 08:10, within 1 hour of surgical incision.    Beta-blocker contraindicated for this pt, due to bradycardia.  
Spiritual Care Assessment/Progress Note  [facility]    Name: Sahara Neff MRN: [id number]    Age: 73 y.o.     Sex: female   Language: English     Date: 1/14/2024            Total Time Calculated: 105 min              Spiritual Assessment begun in [facility] 4 CV INTNSV CARE  Service Provided For:: Family  Referral/Consult From:: Nurse  Encounter Overview/Reason : Spiritual/Emotional Needs    Spiritual beliefs:      [x] Involved in a juan m tradition/spiritual practice: Yazidism     [] Supported by a juan m community:      [] Claims no spiritual orientation:      [] Seeking spiritual identity:           [] Adheres to an individual form of spirituality:      [] Not able to assess:                Identified resources for coping and support system:   Support System: Children, Family members       [] Prayer                  [] Devotional reading               [] Music                  [] Guided Imagery     [] Pet visits                                        [] Other: (COMMENT)     Specific area/focus of visit   Encounter:    Crisis:    Spiritual/Emotional needs: Type: Spiritual Support  Ritual, Rites and Sacraments:    Grief, Loss, and Adjustments:    Ethics/Mediation:    Behavioral Health:    Palliative Care:    Advance Care Planning:      Plan/Referrals: Continue Support (comment)    Narrative: Nurse requested to assist daughter, Phoebe of patient. Daughter was verbally processing grief and patient's \"yoyo\" process. Daughter was struggling with the timing of God and the life/legacy of the relational dynamics complicated.  Provided listening presence and input as necessary. Daughter just really needed to speak-verbally process.  listened and empathized.  Daughter expressed appreciation for the house where she and her friend/family stayed the night. Informed of  availability.   Crystal Benedict MS.  Intern.  For spiritual care referrals, please call [phone] (ROLLY).      
TRANSFER - OUT REPORT:    Verbal report given to CVICU RN on Sahara Neff  being transferred to CVICU for routine post-op       Report consisted of patient's Situation, Background, Assessment and   Recommendations(SBAR).     Information from the following report(s) Surgery Report was reviewed with the receiving nurse.           Lines:   CVC Double Lumen 01/12/24 (Active)       Peripheral IV 01/10/24 Right Forearm (Active)   Site Assessment Clean, dry & intact 01/12/24 0400   Line Status Infusing 01/12/24 0400   Line Care Connections checked and tightened;Cap changed 01/12/24 0400   Phlebitis Assessment No symptoms 01/12/24 0400   Infiltration Assessment 0 01/12/24 0400   Alcohol Cap Used Yes 01/12/24 0400   Dressing Status Clean, dry & intact 01/12/24 0400   Dressing Type Transparent 01/12/24 0400       Peripheral IV 01/10/24 Left;Dorsal Forearm (Active)   Site Assessment Clean, dry & intact 01/12/24 0400   Line Status Flushed;Capped 01/12/24 0400   Line Care Cap changed;Connections checked and tightened 01/12/24 0400   Phlebitis Assessment No symptoms 01/12/24 0400   Infiltration Assessment 0 01/12/24 0400   Alcohol Cap Used Yes 01/12/24 0400   Dressing Status Clean, dry & intact 01/12/24 0400   Dressing Type Transparent 01/12/24 0400       Arterial Line 01/12/24 Left Radial (Active)       Introducer 01/12/24 (Active)        Opportunity for questions and clarification was provided.      Patient transported with:  Monitor, O2 @ 10lpm, and Registered Nurse        
Update Note     At time of sign out/evaluation patient is noted to have low blood pressures.   MAP 55-60, on Levophed 25, Vasopressin 0.4, Phenylephrine 60, and epinephrine at 3.   On chart review, patient has had a steady decline in hemodynamics throughout the day.   Up trending lactic, down trending bicarb, no urinary output, increasing pressor support.   There is evidence of multiorgan failure as shown with LFT's>1000, and Oliguric renal failure.     Patient was seen by vascular surgery on 1/12 for Large hematoma right groin with dropping Hgb and hemodynamic instability, taken emergently to the OR for COVERED STENT PLACEMENT OF RIGHT EXTERNAL ILIAC ARTERY EXPLORATION OF LEFT GROIN FOR CONTROL OF BLEEDING.     Shock   Seems distributive, severe vasoplegia  Ddx: post bleed, cardiac procedure, septic, cannot rule out obstructive or posterior pericardial effusion  Bedside pocus reveal adequate contraction of ventricles, CVP>12, Chest tube noted to have adequate drainage, no evidence of cyanosis or decreased ext. Pulses, no findings to suggest compartment syndrome, repeat H&H reveals minimal drop in hemoglobin   She is noted to have uprising lactic to 17, she is severely acidotic, and demonstrates response to bicarb pushes.    Low benefit /yield for SHAN but it would eval. for post pericardial effusion, CT scan would eval for bleeding or other intra-abdominal ischemia/infarct but pt is too unstable for radiology, unlikely to benefit from swan placement, unlikely to have much benefit from CRRT given severe acidosis is related to lactate and pt will not tolerate dialysis,   --began broad spectrum antibiotics,   --stress dose steroids  --Increase bicarb drip to 150, continue PRN pushes   --Max vasopressors support as appropriate  --trial of methylene blue in settings of severe vasoplegia   --transfuse -keep Hgb >8-9    Overall prognosis is grave, discussed with daughter and CT surg    11:30pm, I discussed with 
MAPs remain in 60s- Toby titrated to 60mcg and 3/3 IV albumin given.    1930 Bedside shift change report given to Bairon RN (oncoming nurse) by Shannan RN (offgoing nurse). Report included the following information Nurse Handoff Report.   
informed- 2 more amps of bicarb given and an additional dose of 1g Ca gluconate given.     1955 OG placed and KUB ordered. Intensivist at bedside and ordered an additional amp of bicarb- see MAR.    2000 Bedside shift change report given to Bairon PIMENTEL (oncoming nurse) by Shannan PIMENTEL (offgoing nurse). Report included the following information Nurse Handoff Report.   
flank, abd  Pulmonary:      Effort: Pulmonary effort is normal. No respiratory distress.      Breath sounds: Normal breath sounds.   Abdominal:      General: Abdomen is flat. There is no distension.      Palpations: Abdomen is soft.   Musculoskeletal:         General: Normal range of motion.      Cervical back: Normal range of motion and neck supple. No rigidity.      Right lower leg: No edema.      Left lower leg: No edema.   Skin:     General: Skin is warm and dry.      Capillary Refill: Capillary refill takes less than 2 seconds.      Coloration: Skin is not jaundiced.   Neurological:      General: No focal deficit present.      Mental Status: Mental status is at baseline.      Cranial Nerves: No cranial nerve deficit.      Comments: Sedated, wakes, follows commands, nonfocal   Psychiatric:      Comments: sedated            BP 93/60   Pulse 79   Temp 97.7 °F (36.5 °C)   Resp 18   Ht 1.702 m (5' 7.01\")   Wt 113.4 kg (250 lb)   SpO2 100%   BMI 39.15 kg/m²      Temp (24hrs), Av.3 °F (36.8 °C), Min:96.4 °F (35.8 °C), Max:100 °F (37.8 °C)           Intake/Output:     Intake/Output Summary (Last 24 hours) at 2024 0757  Last data filed at 2024 0700  Gross per 24 hour   Intake 07347.97 ml   Output 3985 ml   Net 11627.97 ml         Imaging    No valid procedures specified.         CRITICAL CARE DOCUMENTATION  I had a face to face encounter with the patient, reviewed and interpreted patient data including clinical events, labs, images, vital signs, I/O's, and examined patient.  I have discussed the case and the plan and management of the patient's care with the consulting services, the bedside nurses and the respiratory therapist.      NOTE OF PERSONAL INVOLVEMENT IN CARE   This patient has a high probability of imminent, clinically significant deterioration, which requires the highest level of preparedness to intervene urgently. I participated in the decision-making and personally managed or directed

## 2024-01-17 NOTE — OP NOTE
[facility]  OPERATIVE REPORT    Name:  HERI MORAN  MR#:  [id number]  :  1950  ACCOUNT #:  [id number]  DATE OF SERVICE:  2024    PREOPERATIVE DIAGNOSIS:  Bleeding right groin.    POSTOPERATIVE DIAGNOSIS:  Bleeding right groin.    PROCEDURE PERFORMED:  1.  Ultrasound-guided cannulation of left common femoral artery.  2.  Aortoiliac angiogram.  3.  Selective catheterization of right common femoral artery.  4.  Covered stent placement, right external iliac artery.  5.  Exploration of left groin for control of bleeding.    SURGEON:  Zafar Rockwell MD    ASSISTANT:  None.    ANESTHESIA:  General endotracheal anesthesia.    COMPLICATIONS:  None.    SPECIMENS REMOVED:  None.    IMPLANTS:  Stent.    ESTIMATED BLOOD LOSS:  500 mL.    INDICATIONS:  The patient is a 73-year-old female, had a heart ***.    PROCEDURE:  *** bleeding site without evidence of complications.  The sheath was removed from the left groin, and the groin closed with Perclose device.  It became clear that the device had failed.    An oblique incision was then made and dissection carried down to the common femoral artery, which was dissected free of its soft tissue attachments.  The bleeding site was identified on the anterior surface of the common femoral artery and repaired with 5-0 Prolene without difficulty or complication.  Wound was irrigated thorough and closed in multiple layers.  She tolerated the procedure well.  There were no obvious complications.  She remained intubated and transferred back to the ICU in critical condition.      ZAFAR ROCKWELL MD      [id number]  D:  2024 10:41  T:  2024 12:37  JOB #:  [id number]

## 2024-01-18 LAB
BACTERIA SPEC CULT: NORMAL
BACTERIA SPEC CULT: NORMAL
SERVICE CMNT-IMP: NORMAL
SERVICE CMNT-IMP: NORMAL

## 2024-01-18 NOTE — OP NOTE
[facility]  OPERATIVE REPORT    Name:  HERI MORAN  MR#:  [id number]  :  1950  ACCOUNT #:  [id number]  DATE OF SERVICE:  2024    PREOPERATIVE DIAGNOSIS:  Bleeding right groin.    POSTOPERATIVE DIAGNOSIS:  Bleeding right groin.    PROCEDURE PERFORMED:  1.  Ultrasound-guided cannulation of the left common femoral artery.  2.  Aortoiliac angiogram.  3.  Selective catheterization of right common femoral artery.  4.  Covered stent placement, right external iliac artery.  5.  Exploration of left groin for control of bleeding and repair of left common femoral artery.    SURGEON:  Sujit Rockwell MD    ASSISTANT:  None.    ANESTHESIA:  General endotracheal anesthesia.    COMPLICATIONS:  None.    SPECIMENS REMOVED:  None.    IMPLANTS:  Stent.    ESTIMATED BLOOD LOSS:  500 mL.    INDICATIONS:  The patient is a 73-year-old female who had heart bypass surgery and develops bleeding from her right groin after removal of an aortic balloon pump.  The decision was made to take her to the operating room for control.    PROCEDURE:  The patient was taken to the operating room.  Once a suitable level of anesthesia was introduced, she was prepped and draped in typical sterile fashion.  Ultrasound examination of the left common femoral artery confirmed it to be patent.  The artery was then accessed under direct ultrasound guidance and a permanent image stored.  Large sheaths were replaced.  Catheter was introduced into the aorta, and aortoiliac angiogram was performed.  This showed significant bleeding from the right distal iliac artery.  The catheter was then used to select out the right common iliac, external iliac, and common femoral artery.  Wire was positioned distally.  A 7-mm covered stent was placed across the bleeding site in the distal iliac artery.  Completion angiography showed good rectification of bleeding without evidence of ongoing bleeding.  The device was removed.  Sheath was removed

## 2024-01-20 NOTE — OP NOTE
[facility]  OPERATIVE REPORT    Name:  HERI MORAN  MR#:  [id number]  :  1950  ACCOUNT #:  [id number]  DATE OF SERVICE:  2024    PREOPERATIVE DIAGNOSES:  1.  Multivessel coronary artery disease.  2.  Severe left main disease (90% stenosis).  3.  Preoperative insertion of intraaortic balloon pump at an outside hospital.    POSTOPERATIVE DIAGNOSES:  1.  Multivessel coronary artery disease.  2.  Severe left main disease (90% stenosis).  3.  Preoperative insertion of intraaortic balloon pump at an outside hospital.    PROCEDURES PERFORMED:  1.  CABG x3 (LIMA to LAD; saphenous vein graft to OM; saphenous vein graft to PDA).  2.  Endoscopic vein harvest, right lower extremity.    SURGEON:  Jackson Adam MD    ASSISTANT:  GLORY Lyons    ANESTHESIA:  General endotracheal anesthesia.    COMPLICATIONS:  None.    SPECIMENS REMOVED:  None.    IMPLANTS:  None.    ESTIMATED BLOOD LOSS:  50 mL.    INDICATIONS:  The patient is a very pleasant 73-year-old woman who was recently diagnosed with severe left main coronary artery disease.  She is now being brought to the operating room to undergo coronary artery bypass grafting.    PROCEDURE:  The patient was brought to the operating room, had a right radial A-line placed without complications, [place]-Mica catheter was placed without complications, and underwent general endotracheal anesthesia without complications.  Chest, abdomen, and lower extremities were prepped and draped in the usual sterile fashion.  A midline incision was made on the patient's sternum.  Cautery dissection was used to dissect down to the level of sternal bone and the sternal bone with a sagittal saw, and the left pleural space was entered.  During this portion of the procedure, endoscopic vein harvesting was performed from the right lower extremity without complications.  Once the left pleural space was entered, the left internal mammary artery was carefully dissected

## (undated) DEVICE — GUIDEWIRE VASC L260CM DIA0.035IN TIP L3MM STD EXCHG PTFE J

## (undated) DEVICE — BANDAGE COMPR ELASTIC 5 YDX6 IN

## (undated) DEVICE — BAND COMPR L24CM REG CLR PLAS HEMSTAT EXT HK AND LOOP RETEN

## (undated) DEVICE — STOPCOCK TRNSDUC 500PSI 3 W ROT M LUER LT BLU OFF HNDL R

## (undated) DEVICE — 6 FOOT DISPOSABLE EXTENSION CABLE WITH SAFE CONNECT / SCREW-DOWN

## (undated) DEVICE — AGENT HEMSTAT W4XL4IN OXIDIZED REGENERATED CELOS ABSRB SFT

## (undated) DEVICE — KIT BLWR MISTER 5P 15L W/ TBNG SET IRRIG MIST TO IMPROVE

## (undated) DEVICE — COVER,LIGHT,CAMERA,HARD,1/PK,STRL: Brand: MEDLINE

## (undated) DEVICE — SUTURE VCRL + SZ 3-0 L27IN ABSRB UD L26MM SH 1/2 CIR VCP416H

## (undated) DEVICE — PRESSURE MONITORING SET: Brand: TRUWAVE

## (undated) DEVICE — DRAIN SURG SGL COLL PT TB FOR ATS BG OASIS

## (undated) DEVICE — SUTURE NONABSORBABLE MONOFILAMENT 7-0 BV-1 1X24 IN PROLENE 8702H

## (undated) DEVICE — CATHETER DIAG 5FR L100CM LUMN ID0.047IN JL4 CRV 0 SIDE H

## (undated) DEVICE — SUTURE VCRL 0 L27IN ABSRB UD CT L40MM 1/2 CIR TAPERPOINT J280H

## (undated) DEVICE — AORTIC PUNCHES ARE USED TO CREATE A UNIFORM OPENING IN BLOOD VESSELS DURING CARDIOVASCULAR SURGERY. THE VESSEL GRAFT IS INSERTED INTO THE CREATED OPENING AND SUTURED TO THE VESSEL WALL. AORTIC LANCETS ARE USED TO MAKE A SMALL UNIFORM CUT IN A BLOOD VESSEL TO FACILITATE INSERTION OF AN AORTIC PUNCH.  PUNCHES COME IN VARIOUS LENGTHS, DIAMETERS AND TIP CONFIGURATIONS.: Brand: CLEANCUT ROTATING AORTIC PUNCH

## (undated) DEVICE — SUTURE PROL SZ 6-0 L24IN NONABSORBABLE BLU L13MM C-1 3/8 8726H

## (undated) DEVICE — GOWN,SIRUS,NONRNF,SETINSLV,XL,20/CS: Brand: MEDLINE

## (undated) DEVICE — PROVE COVER: Brand: UNBRANDED

## (undated) DEVICE — SUTURE VCRL SZ 0 L18IN ABSRB VLT L40MM CT 1/2 CIR J752D

## (undated) DEVICE — FOGARTY ARTERIAL EMBOLECTOMY CATHETER 4F 80CM: Brand: FOGARTY

## (undated) DEVICE — SOLUTION IV 1000ML PH 7.4 INJ NRMSOL R

## (undated) DEVICE — BLADE OPHTH KNF D3MM 15DEG CATRCT BLU MICRO-SHARP

## (undated) DEVICE — ADHESIVE SKIN CLOSURE XL 42 CM 2.7 CC MESH LIQUIBAND SECUR

## (undated) DEVICE — SOLUTION IV 1000ML 0.9% SOD CHL PH 5 INJ USP VIAFLX PLAS

## (undated) DEVICE — GLOVE SURG SZ 7.5 L11.2IN THK9.8MIL STRW LTX POLYMER BEAD

## (undated) DEVICE — CATHETER GUID 5FR L100CM DIA0.058IN NYL SHFT EBU3.0 W/O

## (undated) DEVICE — BLADE OPHTH 180DEG CUT SURF BLU STR SHRP DBL BVL GRINDLESS

## (undated) DEVICE — CANNULA PERF L2IN BLNT TIP 2MM VES CLR RADPQ BODY FEM LUER

## (undated) DEVICE — SUTURE SZ 7 L18IN NONABSORBABLE SIL CCS L48MM 1/2 CIR STRNM M655G

## (undated) DEVICE — SUTURE PROL 5-0 L18IN NONABSORBABLE BLU RB-2 L13MM 1/2 CIR 8713H

## (undated) DEVICE — DESTINATION PERIPHERAL GUIDING SHEATH: Brand: DESTINATION

## (undated) DEVICE — RETRACTOR SURG INSRT SUT HLD OCTOBASE

## (undated) DEVICE — DRAPE,REIN 53X77,STERILE: Brand: MEDLINE

## (undated) DEVICE — PACK PROCEDURE SURG CATH CUST

## (undated) DEVICE — SUTURE MCRYL SZ 3-0 L27IN ABSRB UD L24MM PS-1 3/8 CIR PRIM Y936H

## (undated) DEVICE — CATHETER IABP 8FR 50CC FBROPT CONN W INSRT KT TWO STATLOK

## (undated) DEVICE — CATHETER ANGIO 5FR L65CM 0.035IN VIS OMNI FLUSH-0 SFT TIP

## (undated) DEVICE — PERCLOSE™ PROSTYLE™ SUTURE-MEDIATED CLOSURE AND REPAIR SYSTEM: Brand: PERCLOSE™ PROSTYLE™

## (undated) DEVICE — DISK-SHAPED STYLE, SILICONE (1 PER STERILE PKG): Brand: SCANLAN® RADIOMARK® GRAFT MARKERS

## (undated) DEVICE — OPEN HEART B-RICHMOND: Brand: MEDLINE INDUSTRIES, INC.

## (undated) DEVICE — SYRINGE MED 50ML LUERLOCK TIP

## (undated) DEVICE — TIDISHIELD TRANSPORT, CONTAINMENT COVER FOR BACK TABLE 4'6" (1.37M) TO 8' (2.43M) IN LENGTH: Brand: TIDISHIELD

## (undated) DEVICE — OPEN HEART A- RICHMOND: Brand: MEDLINE INDUSTRIES, INC.

## (undated) DEVICE — SPLINT WR VELC FOAM NEUT POS DISP FOR RAD ART ACC SFT STRP

## (undated) DEVICE — CONNECTOR DRNGE 3/8 1/2X3/16X3/16IN BASE L5MM ARM L10-13MM

## (undated) DEVICE — TBG INSUFFLATION LUER LOCK: Brand: MEDLINE INDUSTRIES, INC.

## (undated) DEVICE — DRAPE,ANGIO,BRACH,STERILE,38X44: Brand: MEDLINE

## (undated) DEVICE — BANDAGE COMPR M W6INXL10YD WHT BGE VELC E MTRX HK AND LOOP

## (undated) DEVICE — AVID DUAL STAGE VENOUS DRAINAGE CANNULA: Brand: AVID DUAL STAGE VENOUS DRAINAGE CANNULA

## (undated) DEVICE — GLOVE ORANGE PI 7 1/2   MSG9075

## (undated) DEVICE — SUTURE VCRL SZ 2-0 L36IN ABSRB UD L36MM CT-1 1/2 CIR J945H

## (undated) DEVICE — SUTURE PROL SZ 5-0 L30IN NONABSORBABLE BLU L13MM RB-2 1/2 8710H

## (undated) DEVICE — Device

## (undated) DEVICE — SUTURE NONABSORBABLE MONOFILAMENT 6-0 C-1 1X30 IN PROLENE 8706H

## (undated) DEVICE — SYRINGE MED 10ML LUERLOCK TIP W/O SFTY DISP

## (undated) DEVICE — INTENT OT USE PROVIDES A STERILE INTERFACE BETWEEN THE OPERATING ROOM SURGICAL LAMPS (NON-STERILE) AND THE SURGEON OR STAFF WORKING IN THE STERILE FIELD.: Brand: ASPEN® ALC PLUS LIGHT HANDLE COVER

## (undated) DEVICE — SYSTEM ENDOSCP VES HARV W/ TOOL CANN SEAL SHT PRT BLNT TIP

## (undated) DEVICE — GLIDESHEATH SLENDER STAINLESS STEEL KIT: Brand: GLIDESHEATH SLENDER

## (undated) DEVICE — SOLUTION IV 500ML 0.9% SOD CHL PH 5 INJ USP VIAFLX PLAS

## (undated) DEVICE — MICROPUNCTURE INTRODUCER SET SILHOUETTE TRANSITIONLESS WITH STAINLESS STEEL WIRE GUIDE: Brand: MICROPUNCTURE

## (undated) DEVICE — TEMP PACING WIRE: Brand: MYO/WIRE

## (undated) DEVICE — 1000ML,PRESSURE INFUSER W/STOPCOCK: Brand: MEDLINE

## (undated) DEVICE — CANNULA SUCT L8.5IN DIA20FR VENT CONN 3/8IN ART MTL CRV TIP

## (undated) DEVICE — VASCULAR-SMH: Brand: MEDLINE INDUSTRIES, INC.

## (undated) DEVICE — DRESSING FOAM W8.7XL9.1IN SAFETAC LAYR SELF ADH MEPILEX

## (undated) DEVICE — KIT,ANTI FOG,W/SPONGE & FLUID,SOFT PACK: Brand: MEDLINE

## (undated) DEVICE — ELECTRODES QUIK COMBO RTS DEFIB

## (undated) DEVICE — SUTURE VCRL SZ 2-0 L27IN ABSRB UD L26MM SH 1/2 CIR J417H

## (undated) DEVICE — SOLUTION IRRIG 1000ML STRL H2O USP PLAS POUR BTL

## (undated) DEVICE — SUTURE PROL SZ 8-0 L24IN NONABSORBABLE BLU L6.5MM BV130-5 8732H

## (undated) DEVICE — 3M™ TEGADERM™ TRANSPARENT FILM DRESSING FRAME STYLE, 1626W, 4 IN X 4-3/4 IN (10 CM X 12 CM), 50/CT 4CT/CASE: Brand: 3M™ TEGADERM™

## (undated) DEVICE — SUTURE PROL SZ 4-0 L36IN NONABSORBABLE BLU L26MM SH 1/2 CIR 8521H

## (undated) DEVICE — SPONGE GZ W4XL4IN COT RADPQ HIGHLY ABSRB

## (undated) DEVICE — GLIDESHEATH SLENDER ACCESS KIT: Brand: GLIDESHEATH SLENDER

## (undated) DEVICE — MEDI-TRACE CADENCE ADULT, DEFIBRILLATION ELECTRODE -RTS  (10 PR/PK) - PHYSIO-CONTROL: Brand: MEDI-TRACE CADENCE

## (undated) DEVICE — DRAPE SLUSH DISC W44XL66IN ST FOR RND BSIN HUSH SLUSH SYS

## (undated) DEVICE — WRAP SURG W1.31XL1.34M CARD FOR PT 165-172CM THERMOWRP

## (undated) DEVICE — PINNACLE INTRODUCER SHEATH: Brand: PINNACLE

## (undated) DEVICE — DRAIN,WOUND,ROUND,24FR,5/16",FULL-FLUTED: Brand: MEDLINE

## (undated) DEVICE — SENSOR PLSE OXMTR AD CBL L36IN ADH FRM FIT SPO2 DISP

## (undated) DEVICE — MICROPUNCTURE INTRODUCER SET SILHOUETTE TRANSITIONLESS WITH NITINOL WIRE GUIDE: Brand: MICROPUNCTURE

## (undated) DEVICE — SUTURE VCRL 2-0 L27IN ABSRB CT BRAID COAT UD J275H

## (undated) DEVICE — LEAD PACE L475MM CHNL A OR V MYOCARDIAL STEROID ELUT SIL

## (undated) DEVICE — TRANSFER BAG 300 ML: Brand: HAEMONETICS

## (undated) DEVICE — SOLUTION IV 1000ML 140MEQ/L SOD 5MEQ/L K 3MEQ/L MG 27MEQ/L

## (undated) DEVICE — ABSORBABLE COLLAGEN HEMOSTATIC SPONGE, 3IN X 4IN, 5MM THICK: Brand: HELISTAT ® ABSORBABLE COLLAGEN HEMOSTATIC SPONGE

## (undated) DEVICE — DRAPE EQUIP XR CASSETTE LG 24.5X23.5 IN LF

## (undated) DEVICE — CARD SMRT DISP TRANSIT TIME MEDISTEM VERIQ

## (undated) DEVICE — Device: Brand: VISIONS PV .035 DIGITAL IVUS CATHETER

## (undated) DEVICE — 40418 TRENDELENBURG ONE-STEP ARM PROTECTORS LARGE (1 PAIR): Brand: 40418 TRENDELENBURG ONE-STEP ARM PROTECTORS LARGE (1 PAIR)

## (undated) DEVICE — LIQUIBAND RAPID ADHESIVE 36/CS 0.8ML: Brand: MEDLINE

## (undated) DEVICE — STAPLER SKIN H3.9MM WIRE DIA0.58MM CRWN 6.9MM 35 STPL ROT

## (undated) DEVICE — Device: Brand: JELCO

## (undated) DEVICE — CATHETER DIAG 5FR L100CM LUMN ID0.047IN JR4 CRV 0 SIDE H

## (undated) DEVICE — DRAPE EP LT SUBCLAV ENTRY SHLD SORBX

## (undated) DEVICE — SUTURE DEK POLY GRN BR SZ3 0 T 2 2N 6716